# Patient Record
Sex: FEMALE | Race: WHITE | NOT HISPANIC OR LATINO | Employment: OTHER | URBAN - METROPOLITAN AREA
[De-identification: names, ages, dates, MRNs, and addresses within clinical notes are randomized per-mention and may not be internally consistent; named-entity substitution may affect disease eponyms.]

---

## 2017-06-23 ENCOUNTER — APPOINTMENT (OUTPATIENT)
Dept: LAB | Age: 77
End: 2017-06-23
Payer: MEDICARE

## 2017-06-23 ENCOUNTER — TRANSCRIBE ORDERS (OUTPATIENT)
Dept: ADMINISTRATIVE | Age: 77
End: 2017-06-23

## 2017-06-23 DIAGNOSIS — M81.0 SENILE OSTEOPOROSIS: ICD-10-CM

## 2017-06-23 DIAGNOSIS — E78.2 MIXED HYPERLIPIDEMIA: ICD-10-CM

## 2017-06-23 DIAGNOSIS — E78.2 MIXED HYPERLIPIDEMIA: Primary | ICD-10-CM

## 2017-06-23 LAB
25(OH)D3 SERPL-MCNC: 34.4 NG/ML (ref 30–100)
ALBUMIN SERPL BCP-MCNC: 3.4 G/DL (ref 3.5–5)
ALP SERPL-CCNC: 89 U/L (ref 46–116)
ALT SERPL W P-5'-P-CCNC: 26 U/L (ref 12–78)
ANION GAP SERPL CALCULATED.3IONS-SCNC: 8 MMOL/L (ref 4–13)
AST SERPL W P-5'-P-CCNC: 19 U/L (ref 5–45)
BASOPHILS # BLD AUTO: 0.05 THOUSANDS/ΜL (ref 0–0.1)
BASOPHILS NFR BLD AUTO: 1 % (ref 0–1)
BILIRUB SERPL-MCNC: 0.39 MG/DL (ref 0.2–1)
BUN SERPL-MCNC: 16 MG/DL (ref 5–25)
CALCIUM SERPL-MCNC: 9.4 MG/DL (ref 8.3–10.1)
CHLORIDE SERPL-SCNC: 101 MMOL/L (ref 100–108)
CHOLEST SERPL-MCNC: 185 MG/DL (ref 50–200)
CO2 SERPL-SCNC: 29 MMOL/L (ref 21–32)
CREAT SERPL-MCNC: 0.55 MG/DL (ref 0.6–1.3)
EOSINOPHIL # BLD AUTO: 0.14 THOUSAND/ΜL (ref 0–0.61)
EOSINOPHIL NFR BLD AUTO: 2 % (ref 0–6)
ERYTHROCYTE [DISTWIDTH] IN BLOOD BY AUTOMATED COUNT: 16.2 % (ref 11.6–15.1)
EST. AVERAGE GLUCOSE BLD GHB EST-MCNC: 137 MG/DL
GFR SERPL CREATININE-BSD FRML MDRD: >60 ML/MIN/1.73SQ M
GLUCOSE P FAST SERPL-MCNC: 134 MG/DL (ref 65–99)
HBA1C MFR BLD: 6.4 % (ref 4.2–6.3)
HCT VFR BLD AUTO: 46 % (ref 34.8–46.1)
HDLC SERPL-MCNC: 41 MG/DL (ref 40–60)
HGB BLD-MCNC: 14.4 G/DL (ref 11.5–15.4)
LDLC SERPL CALC-MCNC: 111 MG/DL (ref 0–100)
LYMPHOCYTES # BLD AUTO: 1.27 THOUSANDS/ΜL (ref 0.6–4.47)
LYMPHOCYTES NFR BLD AUTO: 20 % (ref 14–44)
MCH RBC QN AUTO: 28.5 PG (ref 26.8–34.3)
MCHC RBC AUTO-ENTMCNC: 31.3 G/DL (ref 31.4–37.4)
MCV RBC AUTO: 91 FL (ref 82–98)
MONOCYTES # BLD AUTO: 0.46 THOUSAND/ΜL (ref 0.17–1.22)
MONOCYTES NFR BLD AUTO: 7 % (ref 4–12)
NEUTROPHILS # BLD AUTO: 4.47 THOUSANDS/ΜL (ref 1.85–7.62)
NEUTS SEG NFR BLD AUTO: 70 % (ref 43–75)
NRBC BLD AUTO-RTO: 0 /100 WBCS
PLATELET # BLD AUTO: 254 THOUSANDS/UL (ref 149–390)
PMV BLD AUTO: 12.7 FL (ref 8.9–12.7)
POTASSIUM SERPL-SCNC: 4 MMOL/L (ref 3.5–5.3)
PROT SERPL-MCNC: 7.4 G/DL (ref 6.4–8.2)
RBC # BLD AUTO: 5.06 MILLION/UL (ref 3.81–5.12)
SODIUM SERPL-SCNC: 138 MMOL/L (ref 136–145)
TRIGL SERPL-MCNC: 167 MG/DL
TSH SERPL DL<=0.05 MIU/L-ACNC: 1.22 UIU/ML (ref 0.36–3.74)
WBC # BLD AUTO: 6.4 THOUSAND/UL (ref 4.31–10.16)

## 2017-06-23 PROCEDURE — 80053 COMPREHEN METABOLIC PANEL: CPT | Performed by: INTERNAL MEDICINE

## 2017-06-23 PROCEDURE — 36415 COLL VENOUS BLD VENIPUNCTURE: CPT

## 2017-06-23 PROCEDURE — 85025 COMPLETE CBC W/AUTO DIFF WBC: CPT

## 2017-06-23 PROCEDURE — 82306 VITAMIN D 25 HYDROXY: CPT

## 2017-06-23 PROCEDURE — 84443 ASSAY THYROID STIM HORMONE: CPT

## 2017-06-23 PROCEDURE — 83036 HEMOGLOBIN GLYCOSYLATED A1C: CPT

## 2017-06-23 PROCEDURE — 80061 LIPID PANEL: CPT

## 2017-07-11 ENCOUNTER — ALLSCRIPTS OFFICE VISIT (OUTPATIENT)
Dept: OTHER | Facility: OTHER | Age: 77
End: 2017-07-11

## 2017-08-07 ENCOUNTER — GENERIC CONVERSION - ENCOUNTER (OUTPATIENT)
Dept: OTHER | Facility: OTHER | Age: 77
End: 2017-08-07

## 2017-10-09 ENCOUNTER — GENERIC CONVERSION - ENCOUNTER (OUTPATIENT)
Dept: OTHER | Facility: OTHER | Age: 77
End: 2017-10-09

## 2017-10-13 ENCOUNTER — TRANSCRIBE ORDERS (OUTPATIENT)
Dept: ADMINISTRATIVE | Age: 77
End: 2017-10-13

## 2017-10-13 ENCOUNTER — LAB CONVERSION - ENCOUNTER (OUTPATIENT)
Dept: OTHER | Facility: OTHER | Age: 77
End: 2017-10-13

## 2017-10-13 ENCOUNTER — APPOINTMENT (OUTPATIENT)
Dept: LAB | Age: 77
End: 2017-10-13
Payer: MEDICARE

## 2017-10-13 DIAGNOSIS — E78.2 MIXED HYPERLIPIDEMIA: Primary | ICD-10-CM

## 2017-10-13 DIAGNOSIS — E78.2 MIXED HYPERLIPIDEMIA: ICD-10-CM

## 2017-10-13 DIAGNOSIS — E13.8 DIABETES MELLITUS OF OTHER TYPE WITH COMPLICATION, UNSPECIFIED LONG TERM INSULIN USE STATUS: ICD-10-CM

## 2017-10-13 LAB
ALBUMIN SERPL BCP-MCNC: 3.3 G/DL (ref 3.5–5)
ALP SERPL-CCNC: 84 U/L (ref 46–116)
ALT SERPL W P-5'-P-CCNC: 23 U/L (ref 12–78)
ANION GAP SERPL CALCULATED.3IONS-SCNC: 5 MMOL/L (ref 4–13)
AST SERPL W P-5'-P-CCNC: 12 U/L (ref 5–45)
BILIRUB SERPL-MCNC: 0.46 MG/DL (ref 0.2–1)
BUN SERPL-MCNC: 13 MG/DL (ref 5–25)
CALCIUM SERPL-MCNC: 9.4 MG/DL (ref 8.3–10.1)
CHLORIDE SERPL-SCNC: 100 MMOL/L (ref 100–108)
CHOLEST SERPL-MCNC: 167 MG/DL (ref 50–200)
CO2 SERPL-SCNC: 31 MMOL/L (ref 21–32)
CREAT SERPL-MCNC: 0.54 MG/DL (ref 0.6–1.3)
EST. AVERAGE GLUCOSE BLD GHB EST-MCNC: 134 MG/DL
GFR SERPL CREATININE-BSD FRML MDRD: 91 ML/MIN/1.73SQ M
GLUCOSE P FAST SERPL-MCNC: 118 MG/DL (ref 65–99)
HBA1C MFR BLD: 6.3 % (ref 4.2–6.3)
HDLC SERPL-MCNC: 45 MG/DL (ref 40–60)
LDLC SERPL CALC-MCNC: 89 MG/DL (ref 0–100)
POTASSIUM SERPL-SCNC: 4.1 MMOL/L (ref 3.5–5.3)
PROT SERPL-MCNC: 7.4 G/DL (ref 6.4–8.2)
SODIUM SERPL-SCNC: 136 MMOL/L (ref 136–145)
TRIGL SERPL-MCNC: 165 MG/DL

## 2017-10-13 PROCEDURE — 80053 COMPREHEN METABOLIC PANEL: CPT

## 2017-10-13 PROCEDURE — 83036 HEMOGLOBIN GLYCOSYLATED A1C: CPT

## 2017-10-13 PROCEDURE — 36415 COLL VENOUS BLD VENIPUNCTURE: CPT

## 2017-10-13 PROCEDURE — 80061 LIPID PANEL: CPT

## 2018-01-10 NOTE — MISCELLANEOUS
Letter of Medical Necessity    2017    Patient name: Ramona Lambert                              Equipment: Ramona Henrandez Power Wheelchair  INS ID: [id number]; [id number]  : 1940                                                 DOP: 10/20/2009  DX: Ariane Fernandez                                                               S/N: [id number]      Sigifredo Patterson utilizes her power wheelchair as her primary means of mobility and depends on her  power wheelchair for all of her MRADLs  Sigifredo Patterson still uses her power wheelchair & it requires repairs  Sigifredo Patterson requires the following replacement part(s) for use on her power wheelchair  1   [employer] Part# M22 SLD G FT- 12volt, 50 Amp Heavy  Duty Gel Battery (2) each: not holding a charge, low voltage  2  [employer] Part# [id number]- Tire, 14" flat free (2) each: tires worn, deteriorated beyond factory services limits  3  [employer] Part# [id number]- shakira wheel w/ bearing 6x2 (2) each: casters  are cracked and bearing disintegration  The above repairs will require 17 units of labor to remove the old parts and to install the new parts, and perform diagnosis and safety inspection  The above repair is necessary to keep the power wheelchair  safe and operational for Sigifredo Patterson  As the treating physician following the care of Sigifredo Patterson I have read and agree with the information in this report  I concur that the above mentioned parts/repairs are necessary  PAUL Hills                                                2017                          NPI: [id number]      Electronically signed Handy HENDERSON    Aug 14 2017  3:22PM EST Author

## 2018-01-13 VITALS — RESPIRATION RATE: 16 BRPM | HEART RATE: 72 BPM | SYSTOLIC BLOOD PRESSURE: 128 MMHG | DIASTOLIC BLOOD PRESSURE: 70 MMHG

## 2018-01-22 VITALS — DIASTOLIC BLOOD PRESSURE: 82 MMHG | HEART RATE: 68 BPM | RESPIRATION RATE: 16 BRPM | SYSTOLIC BLOOD PRESSURE: 138 MMHG

## 2018-05-15 ENCOUNTER — TELEPHONE (OUTPATIENT)
Dept: NEUROLOGY | Facility: CLINIC | Age: 78
End: 2018-05-15

## 2018-05-15 NOTE — TELEPHONE ENCOUNTER
Left message stating that Dr Simone Oliveira has a meeting and we have to switch her appt to a different time with Emilio Issa

## 2018-05-25 ENCOUNTER — APPOINTMENT (OUTPATIENT)
Dept: LAB | Age: 78
End: 2018-05-25
Payer: MEDICARE

## 2018-05-25 ENCOUNTER — TRANSCRIBE ORDERS (OUTPATIENT)
Dept: ADMINISTRATIVE | Age: 78
End: 2018-05-25

## 2018-05-25 DIAGNOSIS — I10 ESSENTIAL HYPERTENSION, MALIGNANT: Primary | ICD-10-CM

## 2018-05-25 DIAGNOSIS — I10 ESSENTIAL HYPERTENSION, MALIGNANT: ICD-10-CM

## 2018-05-25 LAB
ALBUMIN SERPL BCP-MCNC: 3.3 G/DL (ref 3.5–5)
ALP SERPL-CCNC: 90 U/L (ref 46–116)
ALT SERPL W P-5'-P-CCNC: 28 U/L (ref 12–78)
ANION GAP SERPL CALCULATED.3IONS-SCNC: 7 MMOL/L (ref 4–13)
AST SERPL W P-5'-P-CCNC: 24 U/L (ref 5–45)
BASOPHILS # BLD AUTO: 0.04 THOUSANDS/ΜL (ref 0–0.1)
BASOPHILS NFR BLD AUTO: 1 % (ref 0–1)
BILIRUB SERPL-MCNC: 0.45 MG/DL (ref 0.2–1)
BUN SERPL-MCNC: 16 MG/DL (ref 5–25)
CALCIUM SERPL-MCNC: 9.1 MG/DL (ref 8.3–10.1)
CHLORIDE SERPL-SCNC: 101 MMOL/L (ref 100–108)
CHOLEST SERPL-MCNC: 149 MG/DL (ref 50–200)
CO2 SERPL-SCNC: 27 MMOL/L (ref 21–32)
CREAT SERPL-MCNC: 0.66 MG/DL (ref 0.6–1.3)
EOSINOPHIL # BLD AUTO: 0.19 THOUSAND/ΜL (ref 0–0.61)
EOSINOPHIL NFR BLD AUTO: 3 % (ref 0–6)
ERYTHROCYTE [DISTWIDTH] IN BLOOD BY AUTOMATED COUNT: 15.5 % (ref 11.6–15.1)
EST. AVERAGE GLUCOSE BLD GHB EST-MCNC: 131 MG/DL
GFR SERPL CREATININE-BSD FRML MDRD: 85 ML/MIN/1.73SQ M
GLUCOSE P FAST SERPL-MCNC: 111 MG/DL (ref 65–99)
HBA1C MFR BLD: 6.2 % (ref 4.2–6.3)
HCT VFR BLD AUTO: 41.5 % (ref 34.8–46.1)
HDLC SERPL-MCNC: 42 MG/DL (ref 40–60)
HGB BLD-MCNC: 13.4 G/DL (ref 11.5–15.4)
LDLC SERPL CALC-MCNC: 80 MG/DL (ref 0–100)
LYMPHOCYTES # BLD AUTO: 1.46 THOUSANDS/ΜL (ref 0.6–4.47)
LYMPHOCYTES NFR BLD AUTO: 20 % (ref 14–44)
MCH RBC QN AUTO: 28.9 PG (ref 26.8–34.3)
MCHC RBC AUTO-ENTMCNC: 32.3 G/DL (ref 31.4–37.4)
MCV RBC AUTO: 90 FL (ref 82–98)
MONOCYTES # BLD AUTO: 0.6 THOUSAND/ΜL (ref 0.17–1.22)
MONOCYTES NFR BLD AUTO: 8 % (ref 4–12)
NEUTROPHILS # BLD AUTO: 4.92 THOUSANDS/ΜL (ref 1.85–7.62)
NEUTS SEG NFR BLD AUTO: 68 % (ref 43–75)
NONHDLC SERPL-MCNC: 107 MG/DL
NRBC BLD AUTO-RTO: 0 /100 WBCS
PLATELET # BLD AUTO: 292 THOUSANDS/UL (ref 149–390)
PMV BLD AUTO: 11.8 FL (ref 8.9–12.7)
POTASSIUM SERPL-SCNC: 4.3 MMOL/L (ref 3.5–5.3)
PROT SERPL-MCNC: 7.2 G/DL (ref 6.4–8.2)
RBC # BLD AUTO: 4.63 MILLION/UL (ref 3.81–5.12)
SODIUM SERPL-SCNC: 135 MMOL/L (ref 136–145)
TRIGL SERPL-MCNC: 133 MG/DL
TSH SERPL DL<=0.05 MIU/L-ACNC: 1.59 UIU/ML (ref 0.36–3.74)
WBC # BLD AUTO: 7.22 THOUSAND/UL (ref 4.31–10.16)

## 2018-05-25 PROCEDURE — 83036 HEMOGLOBIN GLYCOSYLATED A1C: CPT

## 2018-05-25 PROCEDURE — 85025 COMPLETE CBC W/AUTO DIFF WBC: CPT

## 2018-05-25 PROCEDURE — 80061 LIPID PANEL: CPT

## 2018-05-25 PROCEDURE — 36415 COLL VENOUS BLD VENIPUNCTURE: CPT

## 2018-05-25 PROCEDURE — 80053 COMPREHEN METABOLIC PANEL: CPT

## 2018-05-25 PROCEDURE — 84443 ASSAY THYROID STIM HORMONE: CPT

## 2018-06-11 ENCOUNTER — OFFICE VISIT (OUTPATIENT)
Dept: NEUROLOGY | Facility: CLINIC | Age: 78
End: 2018-06-11
Payer: MEDICARE

## 2018-06-11 VITALS
HEART RATE: 67 BPM | HEIGHT: 65 IN | SYSTOLIC BLOOD PRESSURE: 118 MMHG | BODY MASS INDEX: 31.65 KG/M2 | WEIGHT: 190 LBS | DIASTOLIC BLOOD PRESSURE: 60 MMHG

## 2018-06-11 DIAGNOSIS — R25.2 SPASTICITY: ICD-10-CM

## 2018-06-11 DIAGNOSIS — G35 MULTIPLE SCLEROSIS (HCC): Primary | ICD-10-CM

## 2018-06-11 PROBLEM — G25.0 BENIGN ESSENTIAL TREMOR: Status: ACTIVE | Noted: 2017-07-11

## 2018-06-11 PROBLEM — I10 HYPERTENSION, ESSENTIAL: Status: ACTIVE | Noted: 2017-09-07

## 2018-06-11 PROCEDURE — 99214 OFFICE O/P EST MOD 30 MIN: CPT | Performed by: PHYSICIAN ASSISTANT

## 2018-06-11 RX ORDER — TRAMADOL HYDROCHLORIDE 50 MG/1
50 TABLET ORAL EVERY 6 HOURS
COMMUNITY
Start: 2017-09-07 | End: 2018-09-07

## 2018-06-11 RX ORDER — BACLOFEN 20 MG/1
20 TABLET ORAL 4 TIMES DAILY
Qty: 360 TABLET | Refills: 3 | Status: SHIPPED | OUTPATIENT
Start: 2018-06-11 | End: 2019-06-17 | Stop reason: SDUPTHER

## 2018-06-11 RX ORDER — KETOCONAZOLE 20 MG/ML
SHAMPOO TOPICAL
COMMUNITY
Start: 2016-04-15

## 2018-06-11 RX ORDER — TIZANIDINE 4 MG/1
4 TABLET ORAL 4 TIMES DAILY PRN
Qty: 360 TABLET | Refills: 3 | Status: SHIPPED | OUTPATIENT
Start: 2018-06-11 | End: 2019-06-17 | Stop reason: SDUPTHER

## 2018-06-11 RX ORDER — GABAPENTIN 300 MG/1
CAPSULE ORAL
COMMUNITY
Start: 2015-09-18 | End: 2018-06-11 | Stop reason: SDUPTHER

## 2018-06-11 RX ORDER — GABAPENTIN 300 MG/1
CAPSULE ORAL
Qty: 450 CAPSULE | Refills: 3 | Status: SHIPPED | OUTPATIENT
Start: 2018-06-11 | End: 2019-06-17 | Stop reason: SDUPTHER

## 2018-06-11 RX ORDER — BACLOFEN 20 MG/1
TABLET ORAL
COMMUNITY
End: 2018-06-11 | Stop reason: SDUPTHER

## 2018-06-11 RX ORDER — WARFARIN SODIUM 2.5 MG/1
1 TABLET ORAL DAILY
COMMUNITY

## 2018-06-11 RX ORDER — CLOBETASOL PROPIONATE 0.05 G/ML
SPRAY TOPICAL
COMMUNITY
Start: 2013-10-01

## 2018-06-11 RX ORDER — LOSARTAN POTASSIUM 50 MG/1
1 TABLET ORAL DAILY
COMMUNITY
End: 2018-06-11 | Stop reason: SDUPTHER

## 2018-06-11 RX ORDER — TIZANIDINE 4 MG/1
2 TABLET ORAL
COMMUNITY
Start: 2009-06-12 | End: 2018-06-11 | Stop reason: SDUPTHER

## 2018-06-11 RX ORDER — LOSARTAN POTASSIUM 100 MG/1
100 TABLET ORAL
COMMUNITY
Start: 2018-06-01 | End: 2021-06-15

## 2018-06-11 RX ORDER — ATORVASTATIN CALCIUM 20 MG/1
20 TABLET, FILM COATED ORAL
COMMUNITY
Start: 2018-06-01

## 2018-06-11 RX ORDER — MULTIVITAMIN
TABLET ORAL
COMMUNITY

## 2018-06-11 RX ORDER — SOLIFENACIN SUCCINATE 5 MG/1
5 TABLET, FILM COATED ORAL
COMMUNITY
Start: 2012-10-10 | End: 2018-10-16 | Stop reason: ALTCHOICE

## 2018-06-11 NOTE — PROGRESS NOTES
Patient ID: Amilcar Gates is a 68 y o  female  Assessment/Plan:    Multiple sclerosis ([facility] )  Patient with long-standing MS, not on any IMD therapy  She remains overall stable  She has had some increased spasticity in the right upper extremity, therefore causing trouble with her using the controls on her power wheelchair  She is requesting some therapy for this  Referral given for PT and OT to work on right upper extremity strengthening and functional mobility  We also discussed possibly referring her to physiatry to discuss Botox injections  She reports that her podiatrist suggested Botox for her right foot/ankle due to the foot turning in as well  She has been reluctant to try this  She is going to try therapy 1st and then consider physiatry referral   Exam is overall stable today  Medications were refilled today, specifically gabapentin, baclofen, tizanidine  Will see her back in the fall before she returns to [place] for the winter  She is to call for any new or worsening symptoms  Spasticity  See above  Continue baclofen and tizanidine  Consider referral to physiatry for consideration of Botox injections in the right upper extremity possibly  Diagnoses and all orders for this visit:    Multiple sclerosis ([facility] )  -     Ambulatory referral to Physical Therapy; Future  -     Ambulatory referral to Occupational Therapy; Future    Spasticity    Other orders           Subjective:    HPI    Patient is a 68year old female with PMH of MS diagnosed in 1982, DM, uterine CA and hyperlipidemia, 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,  who presents for MS follow up  Patient last seen in October 2017  Patients symptoms at time of presentation of her MS included facial paresthesias  No hx of optic neuritis  She has never been on IMD therapy  Patient reports at the age of 43 when she presented she had many tests done at [facility] and told she had probable MS    She had some difficulty with walking at that time  In 2000 while in the hospital for cellulitis from falls, she noted some additional overall worsening of her symptoms  Patient had been followed by the same neurologist for about 20 years  She was told that time of diagnosis that 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  Patient has had some tremor in the left arm, mainly with action and not at rest   Patient does have a strong family history of action tremor in her father and her paternal grandfather  It is likely that this is a benign essential tremor  Patient has not been interested in any updated imaging  She has had ongoing facial paresthesias with no change  Patient has remained on gabapentin  She has used tramadol sparingly for breakthrough pain  She also continues with her baclofen and tizanidine, which have been keeping her spasticity stable  Today, patient reports she is about the same as last visit  She is experiencing some sciatic nerve pain down the left leg, which is typical for her at times and is long-standing  She reports she is having some increased trouble with her right arm, which always has some spasticity  She is requesting some therapy for this  She has trouble using the controls on her power wheelchair due to this  She denies change in vision  No change in bowel or bladder, speech or swallowing  No recent infections  No recent hospitalizations  Labs done last month by her PCP  Normal CBC and LFTs  The following portions of the patient's history were reviewed and updated as appropriate: current medications, past family history, past medical history, past social history, past surgical history and problem list          Objective:    Blood pressure 118/60, pulse 67, height 5' 5" (1 651 m), weight 86 2 kg (190 lb)  Physical Exam   Constitutional: She appears well-developed and well-nourished  HENT:   Head: Normocephalic and atraumatic     Eyes: EOM are normal  Pupils are equal, round, and reactive to light  Cardiovascular: Intact distal pulses  Neurological: Coordination normal    Reflex Scores:       Bicep reflexes are 1+ on the right side and 1+ on the left side  Brachioradialis reflexes are 1+ on the right side and 1+ on the left side  Patellar reflexes are 1+ on the right side and 1+ on the left side  Skin: Skin is warm and dry  Psychiatric: She has a normal mood and affect  Her speech is normal        Neurological Exam    Mental Status  The patient is alert and oriented to person, place, time, and situation  Her recent and remote memory are normal  Her speech is normal  Her language is fluent with no aphasia  She has normal attention span and concentration  She has a normal fund of knowledge  Cranial Nerves    CN II: The patient's visual acuity and visual fields are normal   CN III, IV, VI: The patient's pupils are equally round and reactive to light and ocular movements are normal   CN V: The patient has normal facial sensation  CN VII:  The patient has symmetric facial movement  CN VIII:  The patient's hearing is normal   CN IX, X: The patient has symmetric palate movement and normal gag reflex  CN XI: The patient's shoulder shrug strength is normal   CN XII: The patient's tongue is midline without atrophy or fasciculations  Motor  The patient has normal muscle bulk throughout  Her overall muscle tone is increased throughout  Specifically, the tone is spastic in the right arm, increased in the right leg, increased in the left leg                                                Right                   Left   Shoulder abduction                   4+                         4+  Elbow flexion                            4                         4+  Elbow extension                       4                         4+  Hip flexion                                1                         1  Dorsiflexion                               1                         1  trouble with RENE in right UE due to spasticity, mild markel tremor on the left  Dystonia right hand  Bilateral MAFO braces     Sensory  The patient's sensation is normal in all four extremities  Absent vibratory sensation evangelina LE, decreased vib sensation RUE vs LUE     Reflexes  Deep tendon reflexes are 2+ and symmetric except as noted  Right                     Left  Brachioradialis                      1+                         1+  Biceps                                   1+                         1+  Patellar                                 1+                         1+    Gait and Coordination   She has normal coordination bilaterally  In a power wheelchair, unable to ambulate         ROS:    Review of Systems   Constitutional: Negative  Negative for appetite change and fever  HENT: Negative  Negative for hearing loss, tinnitus, trouble swallowing and voice change  Eyes: Negative  Negative for photophobia and pain  Respiratory: Negative  Negative for shortness of breath  Cardiovascular: Negative  Negative for palpitations  Gastrointestinal: Negative  Negative for nausea and vomiting  Endocrine: Negative  Negative for cold intolerance and heat intolerance  Genitourinary: Negative  Negative for dysuria, frequency and urgency  Musculoskeletal: Negative  Negative for myalgias and neck pain  Left leg pain- sciatic nerve   Skin: Negative  Negative for rash  Neurological: Negative  Negative for dizziness, tremors, seizures, syncope, facial asymmetry, speech difficulty, weakness, light-headedness, numbness and headaches  Hematological: Negative  Does not bruise/bleed easily  Psychiatric/Behavioral: Negative  Negative for confusion, hallucinations and sleep disturbance

## 2018-06-11 NOTE — PATIENT INSTRUCTIONS
Will order PT and OT to assist with your right arm and tremor  To consider referral to physiatry  Will refill you tizanidine, baclofen and gabapentin  Follow up in 4 months or sooner if needed    Call for any new symptoms

## 2018-06-11 NOTE — ASSESSMENT & PLAN NOTE
Patient with long-standing MS, not on any IMD therapy  She remains overall stable  She has had some increased spasticity in the right upper extremity, therefore causing trouble with her using the controls on her power wheelchair  She is requesting some therapy for this  Referral given for PT and OT to work on right upper extremity strengthening and functional mobility  We also discussed possibly referring her to physiatry to discuss Botox injections  She reports that her podiatrist suggested Botox for her right foot/ankle due to the foot turning in as well  She has been reluctant to try this  She is going to try therapy 1st and then consider physiatry referral   Exam is overall stable today  Medications were refilled today, specifically gabapentin, baclofen, tizanidine  Will see her back in the fall before she returns to [place] for the winter  She is to call for any new or worsening symptoms

## 2018-06-11 NOTE — ASSESSMENT & PLAN NOTE
See above  Continue baclofen and tizanidine  Consider referral to physiatry for consideration of Botox injections in the right upper extremity possibly

## 2018-06-14 ENCOUNTER — TELEPHONE (OUTPATIENT)
Dept: NEUROLOGY | Facility: CLINIC | Age: 78
End: 2018-06-14

## 2018-06-14 NOTE — TELEPHONE ENCOUNTER
Received a fax from [facility] requesting a call back to confirm supervising physician for Wilver Chávez  I called and spoke to representative and confirmed Dr Stephanie Montoya as her supervising physician

## 2018-07-15 NOTE — PROGRESS NOTES
Occupational Therapy Multiple Sclerosis Evaluation:    Today's Date: 2018  Patient Name: Gracia Elias  : 1940  MRN: [id number]  Referring Provider: Fanny Dias PA-C  Dx: Multiple sclerosis [facility]    Active Problem List:   Patient Active Problem List   Diagnosis    Benign essential tremor    Diabetes mellitus type 2, controlled, without complications ([facility] )    Hyperlipidemia    Hypertension, essential    Multiple sclerosis ([facility] )    Neurogenic bladder    Osteoporosis    Paresthesias    Spasticity     Past Medical Hx:   Past Medical History:   Diagnosis Date    Hyperlipidemia     Multiple sclerosis ([facility] )     Paresthesias      Past Surgical Hx:   Past Surgical History:   Procedure Laterality Date    HYSTERECTOMY        Pain Levels:  Restin    With Activity:  0    Subjective/Patient Goal: "To work on this hand a little bit"    History of Present Illness:  Pt is a pleasant, active, retired 68 y o  female seen for OT eval s/p referred to [address] [place] [address] s/p follow up with neurology for long standing h/o MS, initially dx'd in , now c/o increased facial paresthesias, optic neuritis, difficulty walking, spasticity in RUE, difficulty managing electric w/c controls, also recommended for botox injections w/ PMR c/s, though pt hesitant, now interested in pursuing OT/PT first for more conservative means prior to pursuing PMR c/s, neuro cont'd w/ gabapentin, baclofen, and tizanidine scripts, dx'd w/ MS, spasticity, comorbidities as listed above  Lifestyle Performance Model:  Autonomy: Pt was I w/ UB dressing, max A for LB ADLs/self care, max A for bed bath sponge bathing, max A for catheter management bedpan, shower chair, BSC, grab bars, SPC, QC, RW, HHC, outpt PT, inpt rehab at [facility] in [facility], transfer board w/ gait belt for dependent lateral transfers, has not ambulated since , primarily power scooter/electric w/c bound, tilt in space w/ gel Varun Haring cushion    Reciprocal Relationships: Supportive  Ardelle Peabody  for 54 years Friday , 2 grown children 2 grandchildren  Service to Others: Pt is retired from "[employer]", worked in [employer]  Intrinsic Gratification: Enjoys doing sudoku, puzzles, reading, playing cards, crossword/jigaw puzzles  Home Setup: Pt lives off [address] [place] [address] in [place] w/  Marizol persaud w/ 0 JEREL w/ first floor setup handicapped accessible    Objective  Impairments Section:   UE Strength:   MONY: RUE: 2/200 LUE: 25/200   PINCER: 3 point pinch: RUE unable to complete, LUE:8   2 point pinch: RUE: unable to complete, LUE:5   Lateral pincher: RUE: 2, LUE: 8    Coordination:   9 HOLE PEG TEST:     RUE: unable to complete seconds, LUE: 50 2 Seconds    Range of Motion:  AROM/PROM: RUE spasticity w/ AROM shoulder flexion limited to <25%, distal spastic flexor synergy nonfunctional w/ impaired FMC/FMS/GMC/GMS, impaired prehension patterns    R handed primarily, LUE intention tremors reports is unrelated to MS reports "doctor said it's familial"  Sensation:  MYOFILAMENTS:   RUE: 3 61   LUE: 3 61    Visual Perceptual and Functional Cognition:  1  Brian Cognitive Assessment Version 8 1 (MoCA V8 1)  Visuospatial/executive functionin/5  Naming: 3/3  Memory: 1st trial: 5/5, 2nd trial: 5/5  Attention/concentration: 2/2  List of letters: 1/1  Serial Seven Subtraction: 3/3 w/ 0 errors  Language/sentence repetition: 2/2  Language Fluency: 0/1  Abstract/Correlational Thinkin/2  Delayed Recall: 5/5  Orientation: 6/6   Memory Index Score: 15/15  MoCA V1 8 1 Raw Score: 28/30, MIS: 15/15, indicative of normal neurocognitive functioning  2  Vision Screening Recording Form: Pt denies visual changes, reports recent opthalmologist appt cleared eyes also, denies /VM impairments, + glasses @ all times present for vision screen      Assessment/Plan  Occupational Therapy Skilled Analysis Assessment and Plan of Care:  Pt requires overall max A for ADLs/self care and max Ax1 for fx'l slide board tfers on/off all surfaces, nonambulatory electric w/c bound  Pt is currently demonstrating the following occupational deficits: limited 2* RUE spasticity w/ AROM shoulder flexion limited to <25%, distal spastic flexor synergy nonfunctional w/ impaired FMC/FMS/GMC/GMS, impaired prehension patterns, LUE intention tremors, decreased endurance/activity tolerance, generalized weakness, deconditioning, SOB, BLANCO, fatigue, fall risk, impaired balance, generalized spasticity, nonambulatory, kyphotic anterior forward flexed posture  The following Occupational Performance Areas to address include: eating, grooming, bathing/shower, toilet hygiene, dressing, medication management, socialization, health maintenance, functional mobility, community mobility, clothing management, cleaning, meal prep, money management, household maintenance, care of children, care of pets, job performance/volunteering and social participation  Based on the aforementioned OT evaluation, functional performance deficits, and assessments, pt has been identified as a high complexity evaluation  Pt to continue to benefit from outpatient skilled OT services to address the following goals 2x/wk to  w/in 4 week trial with special focus on LUE tone reduction, self-feeding, grooming, AE, LUE distal strengthening and fx'l use t/o I/ADL/leisure tasks      Goals:  Short Term Goals=Long Term Goals:  Tone:  Pt will demo with decreased RUE  hypertonicity for improved grasp release, termination of flexors on command  50% of time 4 weeks  Pt will demo good carryover of clinic and home tone reduction strategies for improved AROM initiation with functional reach, dressing, hygiene 4 weeks  Modalities:  Pt will tolerate BIONESS for improved motor and sensory performance for overall improved hand to target with 80% accuracy 4 weeks  Pt will tolerate RHS or Saebo Stretch x 6-8 hours for tendon elongation, decreased wrist drop and decreased tone in R hand  Coordination:  Pt will increase prehension patterns for improved tripod with utensil management with <20% droppage 4 weeks  Pt will increase proprioception of  R hand to target for improved functional reach vision occluded with ADL tasks 4 weeks  Pt will increase automaticity of R  to 50% for improved grasp release of tabletop items for improved functional performance with salient tasks 4 weeks  ROM/Function:  Pt will increase R  to functional assist with <20% cuing for tabletop tasks 4 weeks  Pt will increase R  UE to Gross Assist, refined assist, and stabilization with <20% cuing for tabletop tasks 4 weeks  Pt will demo with G carryover of Home Exercise Program to improve functional progression towards goals in Plan of care and for improved functional use of  R 4 weeks  Pt will increase RUE  to refined functional assist with <20% cuing for tabletop tasks for improved functional performance of life roles and salient tasks 4 week    INTERVENTION COMMENTS:  Diagnosis: Multiple sclerosis ([facility] ) [G35]  Precautions: fall risk  FOTO: 5 with 95% limitation  Insurance: MEDICARE A AND B [[id number]]  1 of 10 visits, PN due 8/16/2018    Thank you for the consult!   Please call if you have any questions: [phone]  Noris Rodriguez, OTSANTIAGO, OTR/L, C-GCM, CSRS  Director of Outpatient Neuro Occupational Therapy

## 2018-07-16 ENCOUNTER — EVALUATION (OUTPATIENT)
Dept: OCCUPATIONAL THERAPY | Facility: CLINIC | Age: 78
End: 2018-07-16
Payer: COMMERCIAL

## 2018-07-16 DIAGNOSIS — G35 MULTIPLE SCLEROSIS (HCC): Primary | ICD-10-CM

## 2018-07-16 PROCEDURE — G8985 CARRY GOAL STATUS: HCPCS

## 2018-07-16 PROCEDURE — 97167 OT EVAL HIGH COMPLEX 60 MIN: CPT

## 2018-07-16 PROCEDURE — G8984 CARRY CURRENT STATUS: HCPCS

## 2018-07-23 ENCOUNTER — EVALUATION (OUTPATIENT)
Dept: PHYSICAL THERAPY | Facility: CLINIC | Age: 78
End: 2018-07-23
Payer: COMMERCIAL

## 2018-07-23 DIAGNOSIS — G35 MULTIPLE SCLEROSIS (HCC): ICD-10-CM

## 2018-07-23 PROCEDURE — G8982 BODY POS GOAL STATUS: HCPCS | Performed by: PHYSICAL THERAPIST

## 2018-07-23 PROCEDURE — G8981 BODY POS CURRENT STATUS: HCPCS | Performed by: PHYSICAL THERAPIST

## 2018-07-23 PROCEDURE — 97140 MANUAL THERAPY 1/> REGIONS: CPT | Performed by: PHYSICAL THERAPIST

## 2018-07-23 PROCEDURE — 97161 PT EVAL LOW COMPLEX 20 MIN: CPT | Performed by: PHYSICAL THERAPIST

## 2018-07-23 NOTE — PROGRESS NOTES
PT Evaluation     Today's date: 2018  Patient name: Gracia Elias  : 1940  MRN: [id number]  Referring provider: Fanny Dias PA-C  Dx:   Encounter Diagnosis     ICD-10-CM    1  Multiple sclerosis ([facility] ) [address] Ambulatory referral to Physical Therapy                  Assessment  Impairments: abnormal muscle tone, abnormal or restricted ROM, impaired physical strength and poor posture     Assessment details: Pt is a 68year old female referred for MS however specifically due to her RUE dysfunction and pain  Pt presented today with impairments in increased pain R shoulder, decreased AROM R shoulder, decreased AROM R elbow, (+) tone R biceps, decreased strength BUE all which limit her functionally with using RUE to don clothes, brush hair, and reach forward to use her power WC  Pt will benefit from PT plan below needed to address above deficits to increase use of RUE>  Understanding of Dx/Px/POC: good   Prognosis: fair    Goals  ST  Pt will demonstrate independence with HEP within 4 weeks  2  Pt will increase AROM of R shoulder flexion and abd by at least 5 degrees within 4 weeks  3  Pt will improve R elbow extension by at least 5 deg within 4 weeks  LTGs:  1  Pt will improve R shoulder flex and abd by at least 10 deg within 8 weeks  2  Pt will improve R elbow ext by at least 10 deg within 8 weeks  3  Pt will reduce c/o pain in R shoulder to a 1/10 at worst within 8 weeks  4  Pt will report being able to don her shirt normally again with RUE within 8 weeks      Plan  Patient would benefit from: skilled physical therapy  Planned modality interventions: thermotherapy: hydrocollator packs and cryotherapy  Planned therapy interventions: stretching, strengthening, therapeutic exercise, manual therapy, joint mobilization, home exercise program and patient education  Frequency: 2x week  Duration in weeks: 8  Plan details: Certification period from today 18 through 18        Subjective Evaluation    History of Present Illness  Mechanism of injury: Long-standing diagnosis of MS  At her baseline functionally  Has not walked since   In powerchair  Her  transfers her in and out of bed  Pt then stays in [address]  [facility]  Biggest problem is RUE limited ROM, Had OT john last week, addressing more R hand spasticity and function with them  Wants to be able to move her right shoulder and elbow more to be able to return to combing her hair and using power chair  Has had increased difficulty with donning a shirt because of limited shoulder and elbow AROM  Denies pain in elbow, does have pain in R shoulder occasionally, specifically when attempting to reach forward  Does have complaint of sciatica pain and per  has difficulty with balance in unsupported sitting, pt at this time is not interested in PT services to address this  Pain  Current pain ratin  At best pain ratin  At worst pain rating: 3  Location: R shoulder, around deltoid area, triggered with hand movements  Progression: worsening    Social Support  Lives with: spouse    Hand dominance: right    Treatments  Current treatment: occupational therapy  Patient Goals  Patient goals for therapy: decreased pain, increased motion and increased strength          Objective     Static Posture     Head  Forward  Shoulders  Rounded  Scapulae  Left protracted and right protracted      Active Range of Motion   Left Shoulder   Flexion: 65 degrees   Abduction: 75 degrees     Right Shoulder   Flexion: 30 degrees   Abduction: 58 degrees     Left Elbow   Flexion: WFL  Extension: WFL    Right Elbow   Flexion: WFL  Extension: 80 degrees     Strength/Myotome Testing     Left Shoulder     Planes of Motion   Flexion: 4   Abduction: 3+     Right Shoulder     Planes of Motion   Flexion: 3+ (within her available ROM)   Abduction: 3+ (within her available ROM)     Left Elbow   Flexion: 5  Extension: 5    Right Elbow   Flexion: 5  Extension: 3+    Neuro Exam :     Movement Assessment   Right   Modified Drew UE:  1 (biceps)    Neurologic Exam    Precautions: MS; HTN, DM    Specialty Daily Treatment Diary     Manual  7/23       PROM R shoulder and elbow, all motions, in pt's chair x10 min       Resisted elbow extension on R x10                                   Exercise Diary         Seated scap retract, B        Seated R shoulder ER        Seated forward reaches with RUE touching targets        Seated tilted back AAROM shoulder flex, abd, ER                                                                                                                                            Modalities

## 2018-07-30 ENCOUNTER — APPOINTMENT (OUTPATIENT)
Dept: PHYSICAL THERAPY | Facility: CLINIC | Age: 78
End: 2018-07-30
Payer: COMMERCIAL

## 2018-07-31 ENCOUNTER — OFFICE VISIT (OUTPATIENT)
Dept: OCCUPATIONAL THERAPY | Facility: CLINIC | Age: 78
End: 2018-07-31
Payer: COMMERCIAL

## 2018-07-31 ENCOUNTER — OFFICE VISIT (OUTPATIENT)
Dept: PHYSICAL THERAPY | Facility: CLINIC | Age: 78
End: 2018-07-31
Payer: COMMERCIAL

## 2018-07-31 DIAGNOSIS — G35 MULTIPLE SCLEROSIS (HCC): Primary | ICD-10-CM

## 2018-07-31 PROCEDURE — 97112 NEUROMUSCULAR REEDUCATION: CPT

## 2018-07-31 PROCEDURE — 97140 MANUAL THERAPY 1/> REGIONS: CPT

## 2018-07-31 NOTE — PROGRESS NOTES
Daily Note     Today's date: 2018  Patient name: Nicky Velasco  : 1940  MRN: [id number]  Referring provider: Marbella Zhou PA-C  Dx:   Encounter Diagnosis     ICD-10-CM    1  Multiple sclerosis ([facility] [address] ) G35                   Subjective: Pt  Noted pain in R shoulder pre treatment and throughout exercises  Objective: See treatment diary below      Assessment: Patient had difficulty with wand flexion with keeping her right hand on the cane without falling off  Trailed wrapping with Velcro wraps did not work patient's  hand still slid off of cane  Wand ER and abd trailed with therapist assisting to guide arm and forearm in correct motion with some support to keep wand in place  Trailed isometric shoulder flexion with therapist holding the ball and patient pushing forward into ball  Patient was able to perform  recommended dosage for exercise  Patient noted that she notices her Upper arm performing more of the work then forearm muscles while performing exercises today  Ended treatment early due to patient stating that she was going to have soreness later and some fatigue post exercises  Patient would benefit from continued PT  Plan: Continue per plan of care  Patient will monitor and let therapist know how she feels NV  Precautions: MS; HTN, DM    Specialty Daily Treatment Diary     Manual        PROM R shoulder and elbow, all motions, in pt's chair x10 min x15 min      Resisted elbow extension on R x10 x5 min                                  Exercise Diary   2018      Seated scap retract, B  5"x20      Seated R shoulder ER  15x      Seated forward reaches with RUE touching targets  Reaching for popscile sticks at different arm lengthens and positions   x20      Seated tilted back AAROM shoulder flex, abd, ER  Wand flexion x5  Wand ER and wand abd x20 with therapist assist       Flexion isometrics into small striped ball  5"x10   nv consider shoulder  ER IR EXT Modalities

## 2018-07-31 NOTE — PROGRESS NOTES
Daily Note     Today's date: 2018  Patient name: Win Dozier  : 1940  MRN: [id number]  Referring provider: Chantel Noland PA-C  Dx:   Encounter Diagnosis   Name Primary?  Multiple sclerosis ([place] [address] ) Yes                  Subjective: "Yeah right! I can't "      Objective: See treatment diary below      Assessment: Tolerated treatment fair  Arrived 10min late to session for unknown reason  NMES for 10min to R wrist extensors with PROM to facilitate wrist extension, tone reduction, and sensory input  Completed [place] questionnaire- 5/10 answered yes so proceeded with manual massage and passive stretch to R wrist and digits instead  Card matching task for improved [facility], grasp/release, and hand to target accuracy with card retrieval from R side, supination to reveal card and place on board ahead  Pt unable to complete any AROM R digit extension, requiring hand-over-hand assist to extend  Utilized high flexor tone to grasp card  G ability to supinate to reveal card but max difficulty with flexor termination to release item  Mod verbal cues for encouragement to overcome self-limiting behavior  Plan: Continue skilled OT per POC with focus on RUE tone reduction, [facility], automaticity for grasp/release, and hand to target accuracy        INTERVENTION COMMENTS:  Diagnosis: Multiple sclerosis ([place] [address] ) [G35]  Precautions: fall risk  FOTO: 5 with 95% limitation  Insurance: MEDICARE A AND B [[id number]]  2 of 10 visits, PN due 2018

## 2018-08-01 ENCOUNTER — APPOINTMENT (OUTPATIENT)
Dept: PHYSICAL THERAPY | Facility: CLINIC | Age: 78
End: 2018-08-01
Payer: MEDICARE

## 2018-08-02 ENCOUNTER — OFFICE VISIT (OUTPATIENT)
Dept: OCCUPATIONAL THERAPY | Facility: CLINIC | Age: 78
End: 2018-08-02
Payer: MEDICARE

## 2018-08-02 DIAGNOSIS — G35 MULTIPLE SCLEROSIS (HCC): Primary | ICD-10-CM

## 2018-08-02 PROCEDURE — 97110 THERAPEUTIC EXERCISES: CPT

## 2018-08-02 PROCEDURE — G8985 CARRY GOAL STATUS: HCPCS

## 2018-08-02 PROCEDURE — G8984 CARRY CURRENT STATUS: HCPCS

## 2018-08-02 NOTE — PROGRESS NOTES
Occupational Therapy Daily Note:    Today's date: 2018  Patient name: Winter Mooney  : 1940  MRN: [id number]  Referring provider: Delilah Schroeder PA-C  Dx:   Encounter Diagnosis   Name Primary?  Multiple sclerosis (HCC) Yes     Subjective: "Whatever you want to do I'll give a solid maybe"  Objective: See treatment diary below  Assessment: Pt seen for OT treatment session focusing on proximal/distal MH x 5 minutes, proximal/dsital vibration, trialed NMES again for wrist/MCP/PIP/DIP extension w/ minimal motor output despite multiple trials for moving electrodes  Introduced to [employer] w/ trial for A/B/C panels, best motor output for MCP/DIP extension w/ C panels x10 minutes for neuro modulation neuro motor/sensory re-integration w/ G tolerance  Pt tolerated distal vibration biceps for elbow stretching to achieve extension, able to passively stretch to 60% end range no reports of pain/discomfort  Pt tolerated proximal vibration for trapezius, infra/supraspinatus and levator scapula to passive stretch shoulder to 60% end range for shoulder flexion, no reports of pain/discomfort  Pt tolerated IASTM for retrograde massage, questionnaire indicated 5/10 contraindications though will trial IASTM tools next session for myofasical release  Pt agreeable  Discussed w/ PT transition pt to OT only for RUE management for manual therapy, modalities, and therex/stretching to maximize FMC/FMS/GMC/GMS as prerequisite for UB ADL/IADLS/leisure tasks engagement w/ RUE  Pt, , and Eric Toney PT agreeable  All future PT appointments removed, new calendar printed for pt and , FDC's notified      Pt continues to demonstrate  limited 2* RUE spasticity w/ AROM shoulder flexion limited to <25%, distal spastic flexor synergy nonfunctional w/ impaired FMC/FMS/GMC/GMS, impaired prehension patterns, LUE intention tremors, decreased endurance/activity tolerance, generalized weakness, deconditioning, SOB, BLANCO, fatigue, fall risk, impaired balance, generalized spasticity, nonambulatory, kyphotic anterior forward flexed posture  Tolerated treatment well  Patient would benefit from continued skilled OT  Plan: Continued skilled OT per POC with focus on LUE tone reduction, fx'l LUE use t/o I/ADL/leisure tasks, FMC/FMS/GMC/GMS  INTERVENTION COMMENTS:  Diagnosis: Multiple sclerosis ([facility] [address] ) Lieutenant Harper  Precautions: fall risk  FOTO: 5 with 95% limitation  Insurance: MEDICARE A AND B [[id number]]  3 of 10 visits, PN due 8/16/2018    Thank you for the consult!   Please call if you have any questions: [phone]  Lissette Gonzalez, OTD, OTR/L, C-GCM, CSRS  Director of Outpatient Neuro Occupational Therapy

## 2018-08-06 ENCOUNTER — APPOINTMENT (OUTPATIENT)
Dept: PHYSICAL THERAPY | Facility: CLINIC | Age: 78
End: 2018-08-06
Payer: MEDICARE

## 2018-08-06 ENCOUNTER — OFFICE VISIT (OUTPATIENT)
Dept: OCCUPATIONAL THERAPY | Facility: CLINIC | Age: 78
End: 2018-08-06
Payer: MEDICARE

## 2018-08-06 DIAGNOSIS — G35 MULTIPLE SCLEROSIS (HCC): Primary | ICD-10-CM

## 2018-08-06 PROCEDURE — 97110 THERAPEUTIC EXERCISES: CPT

## 2018-08-06 NOTE — PROGRESS NOTES
Occupational Therapy Daily Note:    Today's date: 2018  Patient name: Ariana Benedict  : 1940  MRN: [id number]  Referring provider: Makayla Gama PA-C  Dx:   Encounter Diagnosis   Name Primary?  Multiple sclerosis (HCC) Yes      Subjective: "I know the goal is to have these fingers spread out but they just won't do it"  Objective: See treatment diary below  Assessment: Pt seen for OT treatment session focusing on proximal/distal MH x5 minutes followed by Bioness for neuro modulation neuro sensory motor re-education x10 minutes w/ G tolerance  Pt tolerated distal vibration biceps for elbow stretching to achieve extension, able to passively stretch to 60% end range no reports of pain/discomfort  Pt tolerated proximal vibration for trapezius, infra/supraspinatus and levator scapula to passive stretch shoulder to 60% end range for shoulder flexion, no reports of pain/discomfort  Trialed IASTM to distal/proximal myofascial release w/ G tolerance, denies pain/discomfort  Pt engaged in subsequent fx'l activity for colored block retrieval to promote MCP/DIP/PIP extension unable to do so, downgraded to bean bag toss w/ LUE for tabletop gross grasp and slide off table, also unable to do so, self limiting behaviors reporting "yeah right" "that'll never happen", max cues to trial engagement in task  Ultimately engaged in [employer] card retrieval from far R peripheral field to far L peripheral field w/ lateral pincer grasp  C/o proximal>distal fatigue post session  Pt continues to demonstrate  limited 2* RUE spasticity w/ AROM shoulder flexion limited to <25%, distal spastic flexor synergy nonfunctional w/ impaired FMC/FMS/GMC/GMS, impaired prehension patterns, LUE intention tremors, decreased endurance/activity tolerance, generalized weakness, deconditioning, SOB, BLANCO, fatigue, fall risk, impaired balance, generalized spasticity, nonambulatory, kyphotic anterior forward flexed posture   Tolerated treatment well  Patient would benefit from continued skilled OT      Plan: Continued skilled OT per POC with focus on LUE tone reduction, fx'l LUE use t/o I/ADL/leisure tasks, FMC/FMS/GMC/GMS      INTERVENTION COMMENTS:  Diagnosis: Multiple sclerosis ([facility] [address] ) Lestine Stagers  Precautions: fall risk  FOTO: 5 with 95% limitation  Insurance: MEDICARE A AND B [[id number]]  4 of 10 visits, PN due 8/16/2018     Thank you for the consult!   Please call if you have any questions: [phone]  Bryn Diaz, OTD, OTR/L, C-GCM, CSRS  Director of Outpatient Neuro Occupational Therapy

## 2018-08-07 ENCOUNTER — TELEPHONE (OUTPATIENT)
Dept: NEUROLOGY | Facility: CLINIC | Age: 78
End: 2018-08-07

## 2018-08-07 NOTE — TELEPHONE ENCOUNTER
Jose Francisco Shannon w/[employer] states patient needs repairs on her wheelchair and she had sent a fax to us on 7/31  (verified she had correct fax number) It is a detailed product description and needs Dr Francisco J Quiroz  I was unable to locate in common drive or patient's chart  I requested she re fax to us

## 2018-08-09 ENCOUNTER — APPOINTMENT (OUTPATIENT)
Dept: PHYSICAL THERAPY | Facility: CLINIC | Age: 78
End: 2018-08-09
Payer: MEDICARE

## 2018-08-09 ENCOUNTER — OFFICE VISIT (OUTPATIENT)
Dept: OCCUPATIONAL THERAPY | Facility: CLINIC | Age: 78
End: 2018-08-09
Payer: MEDICARE

## 2018-08-09 DIAGNOSIS — G35 MULTIPLE SCLEROSIS (HCC): Primary | ICD-10-CM

## 2018-08-09 PROCEDURE — 97112 NEUROMUSCULAR REEDUCATION: CPT

## 2018-08-09 PROCEDURE — 97140 MANUAL THERAPY 1/> REGIONS: CPT

## 2018-08-09 NOTE — PROGRESS NOTES
Daily Note     Today's date: 2018  Patient name: Allyssa Burt  : 1940  MRN: [id number]  Referring provider: Ethel White PA-C  Dx:   Encounter Diagnosis   Name Primary?  Multiple sclerosis (HCC) Yes                  Subjective: "why are we doing that direction?"      Objective: See treatment diary below      Assessment: Tolerated treatment fair  Patient reported that she felt very fatigued after last session and requested to not have heat this session  Provided BIONESS to RUE for 10 minutes on neuro mod  Provided IASTM to bicep, forearm, and palm/digits for tone reduction-noted with tone in distal bicep and educated on massage techniques for home  PROM to RUE in all planes and utilized oval-8s for thumb and index extension  Educated on stretching and massage at home for increasing ROM and comfort  Plan: Continued skilled OT per POC      INTERVENTION COMMENTS:  [facility]: [facility] [G35]  Precautions: fall risk  FOTO:  5 of 10 visits, PN due

## 2018-08-10 ENCOUNTER — TELEPHONE (OUTPATIENT)
Dept: NEUROLOGY | Facility: CLINIC | Age: 78
End: 2018-08-10

## 2018-08-10 DIAGNOSIS — R25.2 SPASTICITY: Primary | ICD-10-CM

## 2018-08-10 NOTE — TELEPHONE ENCOUNTER
----- Message from Yolanda Gibson OT sent at 8/10/2018  7:58 AM EDT -----  Regarding: RE: Splint Request  Either or is fine  I think if you order it in epic you can order bracing and just put a comment as resting hand splint and elbow immobilization splint  Thank you!!      ----- Message -----  From: Nikhil Stallworth PA-C  Sent: 8/10/2018   7:54 AM  To: Genie Chapman OT  Subject: RE: Splint Request                               Hi Amita Valero,    Thanks for the recommendations for Rodolfo  Should I do a hand written script for these? Or do you know if I can do it in EPIC? If hand written, should I just fax it over to you? Thanks,  [facility]   ----- Message -----  From: Yolanda Gibson OT  Sent: 8/9/2018   9:18 PM  To: Nikhil Stallworth PA-C  Subject: Splint Request                                   [facility], [place],  I have the pleasure of seeing Saida Jhoana for outpt OT for her MS RUE management  She would benefit greatly from a resting hand splint for nighttime use and an elbow immobilization splint to prevent further spasticity and reduce tone  If agreeable, can you please write a script for these two splints and we will call our orthotics team to get her fitted? Thank you for the consult!   Please call if you have any questions: [phone]  Mateo Santiago, OTSANTIGAO, OTR/L, C-GCM, CSRS  Director of Outpatient Neuro Occupational Therapy

## 2018-08-13 ENCOUNTER — APPOINTMENT (OUTPATIENT)
Dept: PHYSICAL THERAPY | Facility: CLINIC | Age: 78
End: 2018-08-13
Payer: MEDICARE

## 2018-08-13 ENCOUNTER — OFFICE VISIT (OUTPATIENT)
Dept: OCCUPATIONAL THERAPY | Facility: CLINIC | Age: 78
End: 2018-08-13
Payer: MEDICARE

## 2018-08-13 DIAGNOSIS — G35 MULTIPLE SCLEROSIS (HCC): ICD-10-CM

## 2018-08-13 PROCEDURE — G8979 MOBILITY GOAL STATUS: HCPCS | Performed by: PHYSICAL THERAPIST

## 2018-08-13 PROCEDURE — 97112 NEUROMUSCULAR REEDUCATION: CPT

## 2018-08-13 PROCEDURE — 97140 MANUAL THERAPY 1/> REGIONS: CPT

## 2018-08-13 PROCEDURE — G8980 MOBILITY D/C STATUS: HCPCS | Performed by: PHYSICAL THERAPIST

## 2018-08-13 NOTE — PROGRESS NOTES
Pt d/c'ed at this time to only OT services  OT able to complete shoulder and elbow goals from PT so pt only has to come to one appt  Pt not interested in any other PT services at this time  Goals not met due to unanticipated d/c

## 2018-08-13 NOTE — PROGRESS NOTES
Occupational Therapy Daily Note:     Today's date: 2018  Patient name: Neri Rosas  : 1940  MRN: [id number]  Referring provider: Liz Mejia PA-C  Dx:   Encounter Diagnosis   Name Primary?  Multiple sclerosis (HCC)      Subjective: "I was able to make the sign of the cross the other day at [facility] so I was excited to be able to do that"  Objective: See treatment diary below  Assessment: Pt seen for OT treatment session focusing on distal Bioness x10 minutes for neuro modulation neuro stimulation, x10 minutes proximal NMES for neuromodulation and tone reduction  Pt tolerated distal IASTM to wrist flexors/extensors, MCP/PIP/DIPs, and bicpes, manual massage for trap for tone reduction w/ G tolerance  Pt tolerated PROM supination/pronation, wriest flexion/extension, elbow extension, and shoulder flexion/extension, internal/external rotation, horizontal ab/adduction w/ G tolerance  Discussed w/ pt order placed for RUE resting hand splint for pm use and for elbow immobilization splint for contracture prevention to promote extension and fx'l positioning  Pt aware and agreeable  [facility] scheduled for measurements  at 2pm session  Pt continues to demonstrate  limited 2* RUE spasticity w/ AROM shoulder flexion limited to <25%, distal spastic flexor synergy nonfunctional w/ impaired FMC/FMS/GMC/GMS, impaired prehension patterns, LUE intention tremors, decreased endurance/activity tolerance, generalized weakness, deconditioning, SOB, BLANCO, fatigue, fall risk, impaired balance, generalized spasticity, nonambulatory, kyphotic anterior forward flexed posture  Tolerated treatment well   Patient would benefit from continued skilled OT      Plan: Continued skilled OT per POC with focus on LUE tone reduction, fx'l LUE use t/o I/ADL/leisure tasks, FMC/FMS/GMC/GMS      INTERVENTION COMMENTS:  Diagnosis: Multiple sclerosis ([facility] [address] ) [G35]  Precautions: fall risk  FOTO: 5 with 95% limitation  Insurance: MEDICARE A AND B [[id number]]  6 of 10 visits, PN due 8/16/2018     Thank you for the consult!   Please call if you have any questions: [phone]  Linda Lambert, GERBER, OTR/L, C-GCM, CSRS  Director of Outpatient Neuro Occupational Therapy

## 2018-08-15 NOTE — TELEPHONE ENCOUNTER
Daria Reyes called checking status  I advised her that we have not received fax  I had her refax to [employer] fax #  Form left on MS NavigatorDanae's desk [facility]

## 2018-08-16 ENCOUNTER — OFFICE VISIT (OUTPATIENT)
Dept: OCCUPATIONAL THERAPY | Facility: CLINIC | Age: 78
End: 2018-08-16
Payer: MEDICARE

## 2018-08-16 ENCOUNTER — APPOINTMENT (OUTPATIENT)
Dept: PHYSICAL THERAPY | Facility: CLINIC | Age: 78
End: 2018-08-16
Payer: MEDICARE

## 2018-08-16 DIAGNOSIS — G35 MULTIPLE SCLEROSIS (HCC): Primary | ICD-10-CM

## 2018-08-16 PROCEDURE — 97112 NEUROMUSCULAR REEDUCATION: CPT

## 2018-08-16 PROCEDURE — 97140 MANUAL THERAPY 1/> REGIONS: CPT

## 2018-08-16 NOTE — PROGRESS NOTES
Daily Note     Today's date: 2018  Patient name: Allyssa Butr  : 1940  MRN: [id number]  Referring provider: Ethel White PA-C  Dx:   Encounter Diagnosis   Name Primary?  Multiple sclerosis (HCC) Yes                   Subjective: "My  noticed my arm is easier to move when getting me dressed "      Objective: See treatment diary below      Assessment: Tolerated treatment well  BIONESS to RUE for 10 minutes while provided STM to upper trap  IASTM to RUE for tone reduction followed by PROM to all joints for increasing functional range for dressing  OTR and KRAFT discussed with patient decreasing sessions to 45 minutes for appropriateness of manual treatment sessions  Patient in agreement with this  Plan: Continued skilled OT per POC      INTERVENTION COMMENTS:  Diagnosis: Multiple sclerosis ([facility] [address] ) [G35]  Precautions: fall risk  FOTO:  6  of 10 visits, PN due

## 2018-08-20 ENCOUNTER — APPOINTMENT (OUTPATIENT)
Dept: PHYSICAL THERAPY | Facility: CLINIC | Age: 78
End: 2018-08-20
Payer: MEDICARE

## 2018-08-20 ENCOUNTER — OFFICE VISIT (OUTPATIENT)
Dept: OCCUPATIONAL THERAPY | Facility: CLINIC | Age: 78
End: 2018-08-20
Payer: MEDICARE

## 2018-08-20 DIAGNOSIS — G35 MULTIPLE SCLEROSIS (HCC): Primary | ICD-10-CM

## 2018-08-20 PROCEDURE — 97112 NEUROMUSCULAR REEDUCATION: CPT

## 2018-08-20 PROCEDURE — 97140 MANUAL THERAPY 1/> REGIONS: CPT

## 2018-08-20 NOTE — PROGRESS NOTES
Daily Note     Today's date: 2018  Patient name: Isaias Aguilar  : 1940  MRN: [id number]  Referring provider: Ector Gan PA-C  Dx:   Encounter Diagnosis   Name Primary?  Multiple sclerosis (HCC) Yes                  Subjective: "The other day I was able to turn off a light switch with my right hand! I couldn't do that before "      Objective: See treatment diary below      Assessment: Tolerated treatment well  BIONESS to RUE for 10min on neuromod for motor/sensory re-ed, while therapist provided STM to R upper trap  IASTM to RUE from deltoid distal to digits for tone reduction with min improvement noted  PROM to RUE from scapula distal to digits to improve functional ROM  Pt tolerated well, noted with min improvement in range and tone reduction  Plan: Continue skilled OT per POC with focus on RUE tone reduction, ROM, motor/sensory re-ed, and functional use      INTERVENTION COMMENTS:  [facility]: [facility] Yvon Lynch  Precautions: fall risk  FOTO: 5 with 95% limitation  Insurance: MEDICARE A AND B [[id number]]  8 of 10 visits, PN due 2018

## 2018-08-23 ENCOUNTER — OFFICE VISIT (OUTPATIENT)
Dept: OCCUPATIONAL THERAPY | Facility: CLINIC | Age: 78
End: 2018-08-23
Payer: MEDICARE

## 2018-08-23 ENCOUNTER — APPOINTMENT (OUTPATIENT)
Dept: PHYSICAL THERAPY | Facility: CLINIC | Age: 78
End: 2018-08-23
Payer: MEDICARE

## 2018-08-23 DIAGNOSIS — G35 MULTIPLE SCLEROSIS (HCC): Primary | ICD-10-CM

## 2018-08-23 PROCEDURE — 97112 NEUROMUSCULAR REEDUCATION: CPT | Performed by: OCCUPATIONAL THERAPIST

## 2018-08-23 PROCEDURE — 97140 MANUAL THERAPY 1/> REGIONS: CPT | Performed by: OCCUPATIONAL THERAPIST

## 2018-08-23 NOTE — PROGRESS NOTES
Daily Note     Today's date: 2018  Patient name: Isaias Aguilar  : 1940  MRN: [id number]  Referring provider: Ector Gan PA-C  Dx: No diagnosis found  Subjective: "I don't mind heat if it's cold       Objective: See treatment description below      Assessment: Tolerated treatment fair  Patient would benefit from continued OT    [address] with BIONESS donned on neuro mod setting-- Pt tolerated x 10 minutes with no redness or skin irritation with skin check  MFR/STM to R trap with self-report of decreased stiffness after Manual Tx  IASTM to RUE from bicep distal to digits for tone reduction  Pt with Min griddy feel in hand/digits with significant decreased tone in digits evident-- OTR able to passively range digit to full range after manual tx  PROM to RUE from scapula distal to digits to improve functional ROM, increased engagement in ADL engagement, and increased engagement in meaningful occupations  Pt reports G improvements evident with decreased stiffness reported  OTR recommending Pt's  to attend tx session to carryover tone reduction strategies performed for improved functional progression  Plan: Continue per plan of care          INTERVENTION COMMENTS:  Diagnosis: Multiple sclerosis ([facility] [address] ) Yvon Lynch  Precautions: fall risk  FOTO: 5 with 95% limitation  Insurance: MEDICARE A AND B [[id number]]  9 of 10 visits, PN due 2018

## 2018-08-27 ENCOUNTER — APPOINTMENT (OUTPATIENT)
Dept: PHYSICAL THERAPY | Facility: CLINIC | Age: 78
End: 2018-08-27
Payer: MEDICARE

## 2018-08-30 ENCOUNTER — EVALUATION (OUTPATIENT)
Dept: OCCUPATIONAL THERAPY | Facility: CLINIC | Age: 78
End: 2018-08-30
Payer: MEDICARE

## 2018-08-30 ENCOUNTER — APPOINTMENT (OUTPATIENT)
Dept: PHYSICAL THERAPY | Facility: CLINIC | Age: 78
End: 2018-08-30
Payer: MEDICARE

## 2018-08-30 DIAGNOSIS — G35 MULTIPLE SCLEROSIS (HCC): Primary | ICD-10-CM

## 2018-08-30 PROCEDURE — 97535 SELF CARE MNGMENT TRAINING: CPT | Performed by: OCCUPATIONAL THERAPIST

## 2018-08-30 PROCEDURE — G8991 OTHER PT/OT GOAL STATUS: HCPCS | Performed by: OCCUPATIONAL THERAPIST

## 2018-08-30 PROCEDURE — 97112 NEUROMUSCULAR REEDUCATION: CPT | Performed by: OCCUPATIONAL THERAPIST

## 2018-08-30 PROCEDURE — G8990 OTHER PT/OT CURRENT STATUS: HCPCS | Performed by: OCCUPATIONAL THERAPIST

## 2018-08-30 NOTE — PROGRESS NOTES
Occupational Therapy Multiple Sclerosis Evaluation:    Today's Date: 2018  Patient Name: Seymour Terrell  : 1940  MRN: [id number]  Referring Provider: Chelle Carrion PA-C  Dx: No primary diagnosis found  Active Problem List:   Patient Active Problem List   Diagnosis    Benign essential tremor    Diabetes mellitus type 2, controlled, without complications ([facility] )    Hyperlipidemia    Hypertension, essential    Multiple sclerosis ([facility] )    Neurogenic bladder    Osteoporosis    Paresthesias    Spasticity     Past Medical Hx:   Past Medical History:   Diagnosis Date    Diabetes mellitus ([facility] )     Hyperlipidemia     Hypertension     Multiple sclerosis ([facility] )     Paresthesias      Past Surgical Hx:   Past Surgical History:   Procedure Laterality Date    HYSTERECTOMY        Pain Levels:  Restin    With Activity:  0    Subjective/Patient Goal: "To work on this hand a little bit"    History of Present Illness:  Pt is a pleasant, active, retired 68 y o  female seen for OT eval s/p [facility] s/p follow up with neurology for long standing h/o MS, initially dx'd in , now c/o increased facial paresthesias, optic neuritis, difficulty walking, spasticity in RUE, difficulty managing electric w/c controls, also recommended for botox injections w/ PMR c/s, though pt hesitant, now interested in pursuing OT/PT first for more conservative means prior to pursuing PMR c/s, neuro cont'd w/ gabapentin, baclofen, and tizanidine scripts, dx'd w/ MS, spasticity, comorbidities as listed above      Lifestyle Performance Model:  Autonomy: Pt was I w/ UB dressing, max A for LB ADLs/self care, max A for bed bath sponge bathing, max A for catheter management bedpan, shower chair, BSC, grab bars, SPC, QC, RW, HHC, outpt PT, inpt rehab at [facility] in [facility], transfer board w/ gait belt for dependent lateral transfers, has not ambulated since , primarily power scooter/electric w/c bound, tilt in space w/ gel [place] cushion  Reciprocal Relationships: Supportive  Tara Donaldson  for 54 years , 2 grown children 2 grandchildren  Service to Others: Pt is retired from [employer], worked in [employer]  Intrinsic Gratification: Enjoys doing sudoku, puzzles, reading, playing cards, crossword/jigaw puzzles  Home Setup: [facility] [address] [place] w/  Nonah Freed [address] w/ 0 JEREL w/ first floor setup handicapped accessible    Objective     Functional Assessment     Comments  See impairment section for further details  Impairments Section:   UE Strength:   MONY: RUE: 2/200 LUE: 25/200    R 1, L 29   PINCER: 3 point pinch: RUE unable to complete, LUE:8  remains   2 point pinch: RUE: unable to complete, LUE:5 remains   Lateral pincher: RUE: 2, LUE: 8  R 0 5, L 6    Coordination:   9 HOLE PEG TEST:     RUE: unable to complete seconds, LUE: 50 2 Seconds    Range of Motion:  AROM/PROM: RUE spasticity w/ AROM shoulder flexion limited to <25%, distal spastic flexor synergy nonfunctional w/ impaired FMC/FMS/GMC/GMS, impaired prehension patterns    R Seated PROM:  Shoulder FF: slightly past 1/2  Shoulder ABD: 1/2  Elbow Ext: near full  Elbow flexion: full  Wrist Ext: full  Wrist Flex: full  Sup: full  Pron: full  Digit ext: full     R seated AROM:  Shoulder elevation: 3/4  Elbow flexion: full  Elbow Ext: 3/4   Shoulder FF: Near 1/2 with short lever  Shoulder ABD: 1/4 with short lever  Wrist Ext: neutral  Wrist Flexion: full  Supination: full  Pronation: full     R handed primarily, LUE intention tremors reports is unrelated to MS reports "doctor said it's familial"  Sensation:  MYOFILAMENTS:   RUE: 3 61   LUE: 3 61    Visual Perceptual and Functional Cognition:  1   Brian Cognitive Assessment Version 8 1 (MoCA V8 1)  Visuospatial/executive functionin/5  Naming: 3/3  Memory: 1st trial: 5/5, 2nd trial: 5/5  Attention/concentration: 2/2  List of letters: /  Serial Seven Subtraction: 3/3 w/ 0 errors  Language/sentence repetition: 2/2  Language Fluency: 0/1  Abstract/Correlational Thinkin  Delayed Recall: 5  Orientation:    Memory Index Score: 15/15  MoCA V1 8 1 Raw Score: 28/30, MIS: 15/15, indicative of normal neurocognitive functioning  2  Vision Screening Recording Form: Pt denies visual changes, reports recent opthalmologist appt cleared eyes also, denies /VM impairments, + glasses @ all times present for vision screen  Assessment    Assessment details: See skilled analysis for further details  Occupational Therapy Skilled Analysis Assessment and Plan of Care:  Pt requires overall max A for ADLs/self care and max Ax1 for fx'l slide board tfers on/off all surfaces, nonambulatory electric w/c bound  Pt is currently demonstrating the following occupational deficits: limited 2* RUE spasticity w/ AROM shoulder flexion limited to <25%, distal spastic flexor synergy nonfunctional w/ impaired FMC/FMS/GMC/GMS, impaired prehension patterns, LUE intention tremors, decreased endurance/activity tolerance, generalized weakness, deconditioning, SOB, BLANCO, fatigue, fall risk, impaired balance, generalized spasticity, nonambulatory, kyphotic anterior forward flexed posture  The following Occupational Performance Areas to address include: eating, grooming, bathing/shower, toilet hygiene, dressing, medication management, socialization, health maintenance, functional mobility, community mobility, clothing management, cleaning, meal prep, money management, household maintenance, care of children, care of pets, job performance/volunteering and social participation  Based on the aforementioned OT evaluation, functional performance deficits, and assessments, pt has been identified as a high complexity evaluation   Pt to continue to benefit from outpatient skilled OT services to address the following goals 2x/wk to  w/in 4 week trial with special focus on LUE tone reduction, self-feeding, grooming, AE, LUE distal strengthening and fx'l use t/o I/ADL/leisure tasks  Pt demo with fair + functional progression towards goals in POC  Pt with self-report of increased abilities to range RUE more efficiently to improve bed mobility, decreased tone with increased abilities to passively range hand/digits, increased abilities to make the sign of the cross at [place], and increased abilities to turn bathroom switch on with RUE  Pt with improved AROM/PROM with less stiffness/tone evident  Jonah Pederson attended Re-Evaluation on this date fitting Pt for both R RHS and R dynamic elbow splint to improved LPS  OTR recommending Pt to continue skilled OT services 2x/week for 2-4 more weeks with focus on education to Pt's  on manual techniques, understanding/carryover of wear schedule once receiving RHS and elbow dynamic splint, and to continue to reduce tone for improved PROM/AROM        Goals:  Short Term Goals=Long Term Goals:  Tone:  Pt will demo with decreased RUE  hypertonicity for improved grasp release, termination of flexors on command  50% of time 4 weeks-- NOT MET  Pt will demo good carryover of clinic and home tone reduction strategies for improved AROM initiation with functional reach, dressing, hygiene 4 weeks-- PARTIALLY MET  Modalities:  Pt will tolerate BIONESS for improved motor and sensory performance for overall improved hand to target with 80% accuracy 4 weeks-- NOT MET  Pt will tolerate RHS or [place] Stretch x 6-8 hours for tendon elongation, decreased wrist drop and decreased tone in R hand-- PARTIALLY MET  Coordination:  Pt will increase prehension patterns for improved tripod with utensil management with <20% droppage 4 weeks-- PARTIALLY MET  Pt will increase proprioception of  R hand to target for improved functional reach vision occluded with ADL tasks 4 weeks-- PARTIALLY MET  Pt will increase automaticity of R  to 50% for improved grasp release of tabletop items for improved functional performance with salient tasks 4 weeks--- NOT MET  ROM/Function:  Pt will increase R  to functional assist with <20% cuing for tabletop tasks 4 weeks-- PARTIALLY MET  Pt will increase R  UE to Gross Assist, refined assist, and stabilization with <20% cuing for tabletop tasks 4 weeks-- MET  Pt will demo with G carryover of Home Exercise Program to improve functional progression towards goals in Plan of care and for improved functional use of  R 4 weeks-- PARTIALLY MET  Pt will increase RUE  to refined functional assist with <20% cuing for tabletop tasks for improved functional performance of life roles and salient tasks 4 week-- NOT MET     INTERVENTION COMMENTS:  Diagnosis: Multiple Sclerosis  Precautions: fall risk  FOTO: 5 with 95% limitation  Insurance: MEDICARE A AND B [[id number]]  19 of 10 visits, PN due 09/30/2018

## 2018-09-04 ENCOUNTER — OFFICE VISIT (OUTPATIENT)
Dept: OCCUPATIONAL THERAPY | Facility: CLINIC | Age: 78
End: 2018-09-04
Payer: COMMERCIAL

## 2018-09-04 DIAGNOSIS — G35 MULTIPLE SCLEROSIS (HCC): Primary | ICD-10-CM

## 2018-09-04 PROCEDURE — 97112 NEUROMUSCULAR REEDUCATION: CPT

## 2018-09-04 PROCEDURE — 97140 MANUAL THERAPY 1/> REGIONS: CPT

## 2018-09-04 NOTE — PROGRESS NOTES
Daily Note     Today's date: 2018  Patient name: Arina Eid  : 1940  MRN: [id number]  Referring provider: Doloris Cushing, PA-C  Dx:   Encounter Diagnosis   Name Primary?  Multiple sclerosis ([place] [address] ) Yes                  Subjective: "I did it!"      Objective: See treatment diary below      Assessment: Tolerated treatment well  NMES to right forearm for wrist extension while providing STM to upper trap  IASTM to all digits and palm  Oval 8's to thumb and index finger for extension-focused on c-pinch  Patient was then able to pinch checkers on table top          Plan: Continued skilled OT per POC    INTERVENTION COMMENTS:  Diagnosis: Multiple sclerosis ([place] [address] ) [G35]  Precautions: fall risk  FOTO:  1 of 10 visits, PN due

## 2018-09-04 NOTE — PROGRESS NOTES
Daily Note     Today's date: 9/3/2018  Patient name: Sharan Feng  : 1940  MRN: [id number]  Referring provider: Nathaly Khan PA-C  Dx: No diagnosis found  Subjective: ***    Objective: See treatment diary below    Assessment: Pt seen for OT treatment session focusing on ***    Pt continues to demonstrate  limited 2* RUE spasticity w/ AROM shoulder flexion limited to <25%, distal spastic flexor synergy nonfunctional w/ impaired FMC/FMS/GMC/GMS, impaired prehension patterns, LUE intention tremors, decreased endurance/activity tolerance, generalized weakness, deconditioning, SOB, BLANCO, fatigue, fall risk, impaired balance, generalized spasticity, nonambulatory, kyphotic anterior forward flexed posture  Tolerated treatment well  Patient would benefit from continued skilled OT      Plan: Continued skilled OT per POC with focus on LUE tone reduction, fx'l LUE use t/o I/ADL/leisure tasks, FMC/FMS/GMC/GMS  INTERVENTION COMMENTS:  Diagnosis: Multiple Sclerosis  Precautions: fall risk  FOTO: 5 with 95% limitation  Insurance: MEDICARE A AND B [[id number]]  5 of 10 visits, PN due 2018     Thank you for the consult!   Please call if you have any questions: [phone]  GERBER Hicks, OTR/L, C-GCM, CSRS  Director of Outpatient Neuro Occupational Therapy

## 2018-09-07 ENCOUNTER — OFFICE VISIT (OUTPATIENT)
Dept: OCCUPATIONAL THERAPY | Facility: CLINIC | Age: 78
End: 2018-09-07
Payer: COMMERCIAL

## 2018-09-07 DIAGNOSIS — G35 MULTIPLE SCLEROSIS (HCC): Primary | ICD-10-CM

## 2018-09-07 PROCEDURE — 97140 MANUAL THERAPY 1/> REGIONS: CPT

## 2018-09-07 PROCEDURE — 97112 NEUROMUSCULAR REEDUCATION: CPT

## 2018-09-07 NOTE — PROGRESS NOTES
Daily Note     Today's date: 2018  Patient name: Kat Foreman  : 1940  MRN: [id number]  Referring provider: Eliana Peralta PA-C  Dx:   Encounter Diagnosis   Name Primary?  Multiple sclerosis (HCC) Yes                  Subjective: "I think I could grasp better without those things on my fingers"  Objective: See treatment diary below      Assessment: Tolerated treatment well  Patient would benefit from continued OT   Pt tolerated NMES to forearm and shoulder w/MH for tone reduction followed by IASTM to traps and RUE  Decrease in tone noted w/BIONESS to digits near full extension  PROM near 3/4 elbow extension post IASTM to bicep tendon  PROM to RUE, all planes  OTR/L educated/discussed dynamic elbow splint w/pt and spouse       Plan: Continued skilled OT per POC    INTERVENTION COMMENTS:  Diagnosis: Multiple sclerosis ([facility] ) [G35]  Precautions: fall risk  FOTO:  2 of 10 visits, PN due

## 2018-09-11 ENCOUNTER — OFFICE VISIT (OUTPATIENT)
Dept: OCCUPATIONAL THERAPY | Facility: CLINIC | Age: 78
End: 2018-09-11
Payer: COMMERCIAL

## 2018-09-11 DIAGNOSIS — G35 MULTIPLE SCLEROSIS (HCC): Primary | ICD-10-CM

## 2018-09-11 PROCEDURE — 97140 MANUAL THERAPY 1/> REGIONS: CPT

## 2018-09-11 PROCEDURE — 97112 NEUROMUSCULAR REEDUCATION: CPT

## 2018-09-11 NOTE — PROGRESS NOTES
Daily Note     Today's date: 2018  Patient name: Gracia Elias  : 1940  MRN: [id number]  Referring provider: Fanny Dias PA-C  Dx:   Encounter Diagnosis   Name Primary?  Multiple sclerosis (HCC) Yes                  Subjective: "The last four days or so I feel like all my hands do is curl "      Objective: See treatment diary below    Assessment: Tolerated treatment fair  Patient would benefit from continued OT   Pt tolerated NMES to R shoulder w/MH for tone reductions simultaneously w/BIONESS to right hand followed by IASTM focusing on tone reduction to bicep and bicep tendon to facilitate elbow extension  [place] splint to R hand to sustain digits in extension, min improvement noted  PROM to RUE,  shoulder abduction near 1/2 range pain free, w/ER         Plan: Continued skilled OT per POC    INTERVENTION COMMENTS:  Diagnosis: [facility] [G35]  Precautions: fall risk  FOTO:  3 of 10 visits, PN due

## 2018-09-12 NOTE — PROGRESS NOTES
Occupational Therapy Daily Note:    Today's date: 2018  Patient name: Candy Topete  : 1940  MRN: [id number]  Referring provider: Cisco Leslie PA-C  Dx:   Encounter Diagnosis   Name Primary?  Multiple sclerosis (HCC) Yes     Subjective: "I feel like I can open my fingers now a lot easier but they just won't stay"  Objective: See treatment diary below  Assessment: Pt seen for OT treatment session focusing on distal neuro modulation w/ Bioness for neuro sensory/motor re-education x10 minutes w/ G tolerance, proximal NMES w/ MH x10 minutes for tone reduction  Pt tolerated IASTM proximally/distally for tone reduction and manual massage to biceps/triceps, wrist flexors/extensors, MCP/DIP/PIP, trap, supra/infraspinatus, donned sandwich splint distally to sustain MCP/DIP/PIP extension, min improvement/carrover  Call placed to [place] to inquire status of elbow immobilization splint and RHS, RHS in per [facility] orthotics though elbow fabriation will take 18-25 business days,pt is slated to go to [facility] mid October  [facility] coming on last OT re-eval for d/c on  at 1pm to fit pt w/ RHS, pt aware will need to go to the office when elbow immobilization splint in, aware and agreeable for both process for getting elbow immobilization splint, reception of RHS on , and OT re-eval for d/c on   Pt continues to demonstrate  limited 2* RUE spasticity w/ AROM shoulder flexion limited to <25%, distal spastic flexor synergy nonfunctional w/ impaired FMC/FMS/GMC/GMS, impaired prehension patterns, LUE intention tremors, decreased endurance/activity tolerance, generalized weakness, deconditioning, SOB, BLANCO, fatigue, fall risk, impaired balance, generalized spasticity, nonambulatory, kyphotic anterior forward flexed posture  Tolerated treatment well   Patient would benefit from continued skilled OT      Plan: Continued skilled OT per POC with focus on LUE tone reduction, fx'l LUE use t/o I/ADL/leisure tasks, FMC/FMS/GMC/GMS  INTERVENTION COMMENTS:  Diagnosis: Multiple sclerosis ([facility] ) [G35]  Precautions: fall risk  FOTO: 5 with 95% limitation  Insurance: MEDICARE A AND B [[id number]]  3 of 10 visits, PN due 9/30/2018     Thank you for the consult!   Please call if you have any questions: [phone]  Neli Person, OTD, OTR/L, C-GCM, CSRS  Director of Outpatient Neuro Occupational Therapy

## 2018-09-13 ENCOUNTER — OFFICE VISIT (OUTPATIENT)
Dept: OCCUPATIONAL THERAPY | Facility: CLINIC | Age: 78
End: 2018-09-13
Payer: COMMERCIAL

## 2018-09-13 DIAGNOSIS — G35 MULTIPLE SCLEROSIS (HCC): Primary | ICD-10-CM

## 2018-09-13 PROCEDURE — G8990 OTHER PT/OT CURRENT STATUS: HCPCS

## 2018-09-13 PROCEDURE — G8991 OTHER PT/OT GOAL STATUS: HCPCS

## 2018-09-13 PROCEDURE — 97140 MANUAL THERAPY 1/> REGIONS: CPT

## 2018-09-13 PROCEDURE — 97112 NEUROMUSCULAR REEDUCATION: CPT

## 2018-09-17 ENCOUNTER — OFFICE VISIT (OUTPATIENT)
Dept: OCCUPATIONAL THERAPY | Facility: CLINIC | Age: 78
End: 2018-09-17
Payer: COMMERCIAL

## 2018-09-17 DIAGNOSIS — G35 MULTIPLE SCLEROSIS (HCC): Primary | ICD-10-CM

## 2018-09-17 PROCEDURE — 97140 MANUAL THERAPY 1/> REGIONS: CPT

## 2018-09-17 PROCEDURE — 97112 NEUROMUSCULAR REEDUCATION: CPT

## 2018-09-17 NOTE — PROGRESS NOTES
Daily Note     Today's date: 2018  Patient name: Torsten Friend  : 1940  MRN: [id number]  Referring provider: Alexey Wilks PA-C  Dx:   Encounter Diagnosis   Name Primary?  Multiple sclerosis ([place] [address] ) Yes                  Subjective: "I guess it's getting better "      Objective: See treatment below  MH to [facility] with NMES to forearm for 10 minutes  IASTM to upper trap and palm  PROM to [facility] for horizontal abduction and ER         Assessment: Tolerated treatment well  Noted with increased tone in upper trap and presented with decreased tone following manual techniques         Plan: Continued skilled OT per POC    INTERVENTION COMMENTS:  Diagnosis: Multiple sclerosis ([place] [address] ) [G35]  Precautions: fall risk  FOTO:  5 of 10 visits, PN due

## 2018-09-20 ENCOUNTER — OFFICE VISIT (OUTPATIENT)
Dept: OCCUPATIONAL THERAPY | Facility: CLINIC | Age: 78
End: 2018-09-20
Payer: COMMERCIAL

## 2018-09-20 DIAGNOSIS — G35 MULTIPLE SCLEROSIS (HCC): Primary | ICD-10-CM

## 2018-09-20 PROCEDURE — 97140 MANUAL THERAPY 1/> REGIONS: CPT

## 2018-09-20 PROCEDURE — 97112 NEUROMUSCULAR REEDUCATION: CPT

## 2018-09-20 NOTE — PROGRESS NOTES
Daily Note     Today's date: 2018  Patient name: Win Dozier  : 1940  MRN: [id number]  Referring provider: Chantel Noland PA-C  Dx:   Encounter Diagnosis   Name Primary?  Multiple sclerosis (HCC) Yes                   Subjective: "I feel so loose "      Objective: See treatment below  Mh to shoulder while completing 10 minutes of neuro mod with BIONESS  IASTM to upper trap and palm/digits  PROM to [facility] joint for abduction  PROM to all digits  Assessment: Tolerated treatment well  Noted with decreased tone due to wearing resting hand splint at night  Has noted with no redness and KRAFT did not observer any redness or bruising during session        Plan: Continued skilled OT per POC    INTERVENTION COMMENTS:  Diagnosis: [facility] [G35]  Precautions: fall risk  FOTO:  6 of 10 visits, PN due

## 2018-09-24 ENCOUNTER — OFFICE VISIT (OUTPATIENT)
Dept: OCCUPATIONAL THERAPY | Facility: CLINIC | Age: 78
End: 2018-09-24
Payer: COMMERCIAL

## 2018-09-24 DIAGNOSIS — G35 MULTIPLE SCLEROSIS (HCC): Primary | ICD-10-CM

## 2018-09-24 PROCEDURE — 97140 MANUAL THERAPY 1/> REGIONS: CPT

## 2018-09-24 NOTE — PROGRESS NOTES
Daily Note     Today's date: 2018  Patient name: Jordin López  : 1940  MRN: [id number]  Referring provider: Romain Cason PA-C  Dx:   Encounter Diagnosis   Name Primary?  Multiple sclerosis ([place] [address] ) Yes                  Subjective: "I have good days and I have bad days "      Objective: See treatment below  MH shoulder with NMES to forearm for wrist and digit extension  IASTM to upper trap and digits followed by PROM to RUE  Assessment: Tolerated treatment well  Noted with decreased tone in RUE and improvement in digit extension         Plan: Continued skilled OT per POC with transition to HEP and DC    INTERVENTION COMMENTS:  Diagnosis: Multiple sclerosis ([place] [address] ) [G35]  Precautions: fall risk  FOTO:  7 of 10 visits, PN due

## 2018-09-26 NOTE — PROGRESS NOTES
Occupational Therapy MS Progress Note/Status Update: Today's Date: 2018  Patient Name: Jeancarlos Gonzalez  : 1940  MRN: [id number]  Referring Provider: Karen Eaton PA-C  Dx: No primary diagnosis found      Active Problem List:   Patient Active Problem List   Diagnosis    Benign essential tremor    Diabetes mellitus type 2, controlled, without complications ([facility] )    Hyperlipidemia    Hypertension, essential    Multiple sclerosis ([facility] )    Neurogenic bladder    Osteoporosis    Paresthesias    Spasticity     Past Medical Hx:   Past Medical History:   Diagnosis Date    Diabetes mellitus ([facility] )     Hyperlipidemia     Hypertension     Multiple sclerosis ([facility] )     Paresthesias      Past Surgical Hx:   Past Surgical History:   Procedure Laterality Date    HYSTERECTOMY          Pain Levels:   Resting:  {gen number [id number]}    With Activity:  {gen number [id number]}    Subjective/Patient Goal: "***"    History of Present Illness:  Pt is a pleasant, active, retired 68 y o  female seen for OT eval s/p referred to [address] [place] [address] s/p follow up with neurology for long standing h/o MS, initially dx'd in , now c/o increased facial paresthesias, optic neuritis, difficulty walking, spasticity in RUE, difficulty managing electric w/c controls, also recommended for botox injections w/ PMR c/s, though pt hesitant, now interested in pursuing OT/PT first for more conservative means prior to pursuing PMR c/s, neuro cont'd w/ gabapentin, baclofen, and tizanidine scripts, dx'd w/ MS, spasticity, comorbidities as listed above      Lifestyle Performance Model:  Autonomy: Pt was I w/ UB dressing, max A for LB ADLs/self care, max A for bed bath sponge bathing, max A for catheter management bedpan, shower chair, BSC, grab bars, SPC, QC, RW, HHC, outpt PT, inpt rehab at [facility] in [facility], transfer board w/ gait belt for dependent lateral transfers, has not ambulated since , primarily power scooter/electric w/c bound, tilt in space w/ gel Prosper cushion  Reciprocal Relationships: Supportive  Kaur Sires  for 54 years Friday July 20th, 2 grown children 2 grandchildren  Service to Others: Pt is retired from [employer], worked in [employer]  Intrinsic Gratification: Enjoys doing sudoku, puzzles, reading, playing cards, crossword/jigaw puzzles  Home Setup: Pt lives off [address] [place] [address] in [place] w/  Ronaldo Lyons w/ 0 JEREL w/ first floor setup handicapped accessible    Objective   Impairments Section:  Coordination:              9 HOLE PEG TEST:                RUE: unable to complete seconds, LUE: 50 2 Seconds     Range of Motion:  AROM/PROM: RUE spasticity w/ AROM shoulder flexion limited to <25%, distal spastic flexor synergy nonfunctional w/ impaired FMC/FMS/GMC/GMS, impaired prehension patterns     R handed primarily, LUE intention tremors reports is unrelated to MS reports "doctor said it's familial"  Sensation:  MYOFILAMENTS:              RUE: 3 61              LUE: 3 61    Visual Perceptual and Functional Cognition:  1  [place] Cognitive Assessment Version 8 1 (MoCA V8 1): Defer 2* scored 28/30 indicative of normal neurocognitive functioning    2  Vision Screening Recording Form: Pt denies visual changes, reports recent opthalmologist appt cleared eyes also, denies /VM impairments, + glasses @ all times present for vision screen  Assessment/Plan  Occupational Therapy Skilled Analysis Assessment and Plan of Care:  Pt requires overall max A for ADLs/self care and max Ax1 for fx'l slide board tfers on/off all surfaces, nonambulatory electric w/c bound   Pt is currently demonstrating the following occupational deficits: limited 2* RUE spasticity w/ AROM shoulder flexion limited to <25%, distal spastic flexor synergy nonfunctional w/ impaired FMC/FMS/GMC/GMS, impaired prehension patterns, LUE intention tremors, decreased endurance/activity tolerance, generalized weakness, deconditioning, SOB, BLANCO, fatigue, fall risk, impaired balance, generalized spasticity, nonambulatory, kyphotic anterior forward flexed posture  The following Occupational Performance Areas to address include: eating, grooming, bathing/shower, toilet hygiene, dressing, medication management, socialization, health maintenance, functional mobility, community mobility, clothing management, cleaning, meal prep, money management, household maintenance, care of children, care of pets, job performance/volunteering and social participation  Based on the aforementioned OT evaluation, functional performance deficits, and assessments, pt has been identified as a high complexity evaluation  Pt to continue to benefit from outpatient skilled OT services to address the following goals 2x/wk to  w/in 4 week trial with special focus on LUE tone reduction, self-feeding, grooming, AE, LUE distal strengthening and fx'l use t/o I/ADL/leisure tasks  Pt seen for OT re-eval/progress note/status update  Pt continues to demonstrate ***  At this point, pt has met functional capacity, no further skillable outpt OT needs indicated, D/C from OT Caseload  Pt received RHS for tone reduction and is awaiting arrival of elbow immobilization splint through [place]  Pt to contact for fitting/comfort upon arrival  No further skillable outpt OT needs indicated  D/C from OT caseload, D/C OT   Pt aware and agreeable      Goals:  Short Term Goals=Long Term Goals:  · Tone:  · Pt will demo with decreased RUE  hypertonicity for improved grasp release, termination of flexors on command  50% of time 4 weeks-MET  · Pt will demo good carryover of clinic and home tone reduction strategies for improved AROM initiation with functional reach, dressing, hygiene 4 weeks-MET  · Modalities:  · Pt will tolerate BIONESS for improved motor and sensory performance for overall improved hand to target with 80% accuracy 4 weeks-MET  · Pt will tolerate RHS or Saebo Stretch x 6-8 hours for tendon elongation, decreased wrist drop and decreased tone in R hand-MET  · Coordination:  · Pt will increase prehension patterns for improved tripod with utensil management with <20% droppage 4 weeks-MET  · Pt will increase proprioception of  R hand to target for improved functional reach vision occluded with ADL tasks 4 weeks-MET  · Pt will increase automaticity of R  to 50% for improved grasp release of tabletop items for improved functional performance with salient tasks 4 weeks-MET  · ROM/Function:  · Pt will increase R  to functional assist with <20% cuing for tabletop tasks 4 weeks-MET  · Pt will increase R  UE to Gross Assist, refined assist, and stabilization with <20% cuing for tabletop tasks 4 weeks-MET  · Pt will demo with G carryover of Home Exercise Program to improve functional progression towards goals in Plan of care and for improved functional use of  R 4 weeks-MET  · Pt will increase RUE  to refined functional assist with <20% cuing for tabletop tasks for improved functional performance of life roles and salient tasks 4 week-MET     INTERVENTION COMMENTS:  Diagnosis: Multiple sclerosis ([facility] ) [G35]  Precautions: fall risk  FOTO:  ***  Insurance: MEDICARE A AND B [[id number]]  5 of 10 visits, PN N/A 2* D/C from Caseload     Thank you for the consult!   Please call if you have any questions: [phone]  GERBER Lagos, OTR/L, C-GCM, CSRS  Director of Outpatient Neuro Occupational Therapy

## 2018-09-27 ENCOUNTER — APPOINTMENT (OUTPATIENT)
Dept: OCCUPATIONAL THERAPY | Facility: CLINIC | Age: 78
End: 2018-09-27
Payer: COMMERCIAL

## 2018-10-04 ENCOUNTER — EVALUATION (OUTPATIENT)
Dept: OCCUPATIONAL THERAPY | Facility: CLINIC | Age: 78
End: 2018-10-04
Payer: MEDICARE

## 2018-10-04 DIAGNOSIS — G35 MULTIPLE SCLEROSIS (HCC): Primary | ICD-10-CM

## 2018-10-04 PROCEDURE — 97112 NEUROMUSCULAR REEDUCATION: CPT | Performed by: OCCUPATIONAL THERAPIST

## 2018-10-04 PROCEDURE — G8992 OTHER PT/OT  D/C STATUS: HCPCS | Performed by: OCCUPATIONAL THERAPIST

## 2018-10-04 PROCEDURE — G8991 OTHER PT/OT GOAL STATUS: HCPCS | Performed by: OCCUPATIONAL THERAPIST

## 2018-10-04 PROCEDURE — G8990 OTHER PT/OT CURRENT STATUS: HCPCS | Performed by: OCCUPATIONAL THERAPIST

## 2018-10-04 NOTE — PROGRESS NOTES
Occupational Therapy Discharge Summary:    Today's Date: 10/4/2018  Patient Name: Eufemia Villanueva  : 1940  MRN: [id number]  Referring Provider: Kiley Dyer PA-C  Dx: No primary diagnosis found  Active Problem List:   Patient Active Problem List   Diagnosis    Benign essential tremor    [facility] mellitus type 2, controlled, without complications ([facility] )    Hyperlipidemia    Hypertension, essential    Multiple sclerosis ([facility] )    Neurogenic bladder    Osteoporosis    Paresthesias    Spasticity     Past Medical Hx:   Past Medical History:   Diagnosis Date    Diabetes mellitus ([facility] )     Hyperlipidemia     Hypertension     Multiple sclerosis ([facility] )     Paresthesias      Past Surgical Hx:   Past Surgical History:   Procedure Laterality Date    HYSTERECTOMY        Pain Levels:  Restin    With Activity:  0    Subjective/Patient Goal: "To work on this hand a little bit"    History of Present Illness:  Pt is a pleasant, active, retired 66 y o  female seen for OT eval s/p referred to [address] [place] [address] s/p follow up with neurology for long standing h/o MS, initially dx'd in , now c/o increased facial paresthesias, optic neuritis, difficulty walking, spasticity in RUE, difficulty managing electric w/c controls, also recommended for botox injections w/ PMR c/s, though pt hesitant, now interested in pursuing OT/PT first for more conservative means prior to pursuing PMR c/s, neuro cont'd w/ gabapentin, baclofen, and tizanidine scripts, dx'd w/ MS, spasticity, comorbidities as listed above      Lifestyle Performance Model:  Autonomy: Pt was I w/ UB dressing, max A for LB ADLs/self care, max A for bed bath sponge bathing, max A for catheter management bedpan, shower chair, BSC, grab bars, SPC, QC, RW, HHC, outpt PT, inpt rehab at [facility] in [facility], transfer board w/ gait belt for dependent lateral transfers, has not ambulated since , primarily power scooter/electric w/c bound, tilt in space w/ gel Ricardo Brock karen  Reciprocal Relationships: Supportive  Davide Murillo  for 54 years Friday July 20th, 2 grown children 2 grandchildren  Service to Others: Pt is retired from [employer], worked in [employer]  Intrinsic Gratification: Enjoys doing sudoku, puzzles, reading, playing cards, crossword/jigaw puzzles  Home Setup: Pt lives off [address] in [place] w/  Zac [place] [address] w/ 0 JEREL w/ first floor setup handicapped accessible    Objective     Functional Assessment     Comments  See impairment section for further details  Impairments Section:   UE Strength:   MONY: RUE: 2/200 LUE: 25/200    R 1, L 29    R 1, L 36   PINCER: 3 point pinch: RUE unable to complete, LUE:8  R unable, L 5 5   2 point pinch: RUE: unable to complete, LUE:5 remains    R 0 5, L 5   Lateral pincher: RUE: 2, LUE: 8  R 0 5, L 6  R 3, L 7 5    Coordination:   9 HOLE PEG TEST:     RUE: unable to complete seconds, LUE: 50 2 Seconds    Range of Motion:  AROM/PROM: RUE spasticity w/ AROM shoulder flexion limited to <25%, distal spastic flexor synergy nonfunctional w/ impaired FMC/FMS/GMC/GMS, impaired prehension patterns    R Seated PROM:  Shoulder FF: slightly past 1/2    3/4  Shoulder ABD: 1/2    Near 3/4  Elbow Ext: near full    remains  Elbow flexion: full    remains  Wrist Ext: full  Wrist Flex: full  Sup: full  Pron: full  Digit ext: full     R seated AROM:  Shoulder elevation: 3/4    full  Elbow flexion: full    remains  Elbow Ext: 1/2  Shoulder FF: Near 1/2 with short lever  Shoulder ABD: 1/4 with short lever    Near 1/2 with short lever  Wrist Ext: neutral  remains  Wrist Flexion: full  Supination: full  Pronation: full     R handed primarily, LUE intention tremors reports is unrelated to MS reports "doctor said it's familial"  Sensation:  MYOFILAMENTS:   RUE: 3 61   LUE: 3 61    Visual Perceptual and Functional Cognition:  1   [place] Cognitive Assessment Version 8 1 (MoCA V8 1)  Visuospatial/executive functionin/5  Naming: 3/3  Memory: 1st trial: , 2nd trial:   Attention/concentration:   List of letters:   Serial Seven Subtraction: 3/3 w/ 0 errors  Language/sentence repetition: 2/2  Language Fluency: 0/1  Abstract/Correlational Thinkin/2  Delayed Recall:   Orientation:    Memory Index Score: 15/15  MoCA V1 8 1 Raw Score: 28/30, MIS: 15/15, indicative of normal neurocognitive functioning  2  Vision Screening Recording Form: Pt denies visual changes, reports recent opthalmologist appt cleared eyes also, denies /VM impairments, + glasses @ all times present for vision screen  Assessment    Assessment details: See skilled analysis for further details  Occupational Therapy Skilled Analysis Assessment and Plan of Care:  Pt requires overall max A for ADLs/self care and max Ax1 for fx'l slide board tfers on/off all surfaces, nonambulatory electric w/c bound  Pt is currently demonstrating the following occupational deficits: limited 2* RUE spasticity w/ AROM shoulder flexion limited to <25%, distal spastic flexor synergy nonfunctional w/ impaired FMC/FMS/GMC/GMS, impaired prehension patterns, LUE intention tremors, decreased endurance/activity tolerance, generalized weakness, deconditioning, SOB, BLANCO, fatigue, fall risk, impaired balance, generalized spasticity, nonambulatory, kyphotic anterior forward flexed posture  The following Occupational Performance Areas to address include: eating, grooming, bathing/shower, toilet hygiene, dressing, medication management, socialization, health maintenance, functional mobility, community mobility, clothing management, cleaning, meal prep, money management, household maintenance, care of children, care of pets, job performance/volunteering and social participation  Based on the aforementioned OT evaluation, functional performance deficits, and assessments, pt has been identified as a high complexity evaluation   Pt to continue to benefit from outpatient skilled OT services to address the following goals 2x/wk to  w/in 4 week trial with special focus on LUE tone reduction, self-feeding, grooming, AE, LUE distal strengthening and fx'l use t/o I/ADL/leisure tasks  Pt recently received RHS and still awaiting dynamic elbow splint from [facility]  OTR will be D/Cing Pt at this time 2* Goals Met and Maximal level reached  OTR educated Pt on the importance of notifying staff if complications of splint arise or if regression is evident  OTR also educated Pt on the importance of continuing HEP and tone reduction strategies in home environment        Goals:  Short Term Goals=Long Term Goals:  Tone:  Pt will demo with decreased RUE  hypertonicity for improved grasp release, termination of flexors on command  50% of time 4 weeks-- NOT MET  Pt will demo good carryover of clinic and home tone reduction strategies for improved AROM initiation with functional reach, dressing, hygiene 4 weeks-- PARTIALLY MET  Modalities:  Pt will tolerate BIONESS for improved motor and sensory performance for overall improved hand to target with 80% accuracy 4 weeks-- NOT MET  Pt will tolerate RHS or Saebo Stretch x 6-8 hours for tendon elongation, decreased wrist drop and decreased tone in R hand-- PARTIALLY MET  Coordination:  Pt will increase prehension patterns for improved tripod with utensil management with <20% droppage 4 weeks-- PARTIALLY MET  Pt will increase proprioception of  R hand to target for improved functional reach vision occluded with ADL tasks 4 weeks-- PARTIALLY MET  Pt will increase automaticity of R  to 50% for improved grasp release of tabletop items for improved functional performance with salient tasks 4 weeks--- NOT MET  ROM/Function:  Pt will increase R  to functional assist with <20% cuing for tabletop tasks 4 weeks-- PARTIALLY MET  Pt will increase R  UE to Gross Assist, refined assist, and stabilization with <20% cuing for tabletop tasks 4 weeks-- MET  Pt will demo with G carryover of Home Exercise Program to improve functional progression towards goals in Plan of care and for improved functional use of  R 4 weeks-- PARTIALLY MET  Pt will increase RUE  to refined functional assist with <20% cuing for tabletop tasks for improved functional performance of life roles and salient tasks 4 week-- NOT MET     INTERVENTION COMMENTS:  Diagnosis: Multiple Sclerosis  Precautions: fall risk  FOTO: 5 with 95% limitation    22, with 85% limitation in Hand function  Insurance: [id number] [[id number]]  8 of 10 visits

## 2018-10-05 ENCOUNTER — TELEPHONE (OUTPATIENT)
Dept: NEUROLOGY | Facility: CLINIC | Age: 78
End: 2018-10-05

## 2018-10-05 NOTE — TELEPHONE ENCOUNTER
Orders for wrist splints signed by Haley Bowen no number on forms to call or fax forms to  Scanned to CF

## 2018-10-11 ENCOUNTER — APPOINTMENT (OUTPATIENT)
Dept: LAB | Age: 78
End: 2018-10-11
Payer: MEDICARE

## 2018-10-11 ENCOUNTER — TRANSCRIBE ORDERS (OUTPATIENT)
Dept: ADMINISTRATIVE | Age: 78
End: 2018-10-11

## 2018-10-11 DIAGNOSIS — E13.9 DIABETES MELLITUS OF OTHER TYPE WITHOUT COMPLICATION, UNSPECIFIED WHETHER LONG TERM INSULIN USE (HCC): ICD-10-CM

## 2018-10-11 DIAGNOSIS — R30.0 DYSURIA: ICD-10-CM

## 2018-10-11 DIAGNOSIS — E78.2 MIXED HYPERLIPIDEMIA: ICD-10-CM

## 2018-10-11 DIAGNOSIS — E78.2 MIXED HYPERLIPIDEMIA: Primary | ICD-10-CM

## 2018-10-11 LAB
ALBUMIN SERPL BCP-MCNC: 3.4 G/DL (ref 3.5–5)
ALP SERPL-CCNC: 73 U/L (ref 46–116)
ALT SERPL W P-5'-P-CCNC: 27 U/L (ref 12–78)
ANION GAP SERPL CALCULATED.3IONS-SCNC: 3 MMOL/L (ref 4–13)
AST SERPL W P-5'-P-CCNC: 12 U/L (ref 5–45)
BILIRUB SERPL-MCNC: 0.35 MG/DL (ref 0.2–1)
BUN SERPL-MCNC: 19 MG/DL (ref 5–25)
CALCIUM SERPL-MCNC: 8.9 MG/DL (ref 8.3–10.1)
CHLORIDE SERPL-SCNC: 100 MMOL/L (ref 100–108)
CHOLEST SERPL-MCNC: 154 MG/DL (ref 50–200)
CO2 SERPL-SCNC: 29 MMOL/L (ref 21–32)
CREAT SERPL-MCNC: 0.57 MG/DL (ref 0.6–1.3)
EST. AVERAGE GLUCOSE BLD GHB EST-MCNC: 131 MG/DL
GFR SERPL CREATININE-BSD FRML MDRD: 89 ML/MIN/1.73SQ M
GLUCOSE P FAST SERPL-MCNC: 115 MG/DL (ref 65–99)
HBA1C MFR BLD: 6.2 % (ref 4.2–6.3)
HDLC SERPL-MCNC: 44 MG/DL (ref 40–60)
LDLC SERPL CALC-MCNC: 86 MG/DL (ref 0–100)
NONHDLC SERPL-MCNC: 110 MG/DL
POTASSIUM SERPL-SCNC: 4.4 MMOL/L (ref 3.5–5.3)
PROT SERPL-MCNC: 6.8 G/DL (ref 6.4–8.2)
SODIUM SERPL-SCNC: 132 MMOL/L (ref 136–145)
TRIGL SERPL-MCNC: 121 MG/DL

## 2018-10-11 PROCEDURE — 36415 COLL VENOUS BLD VENIPUNCTURE: CPT

## 2018-10-11 PROCEDURE — 87086 URINE CULTURE/COLONY COUNT: CPT

## 2018-10-11 PROCEDURE — 83036 HEMOGLOBIN GLYCOSYLATED A1C: CPT

## 2018-10-11 PROCEDURE — 87186 SC STD MICRODIL/AGAR DIL: CPT

## 2018-10-11 PROCEDURE — 80061 LIPID PANEL: CPT

## 2018-10-11 PROCEDURE — 80053 COMPREHEN METABOLIC PANEL: CPT

## 2018-10-11 PROCEDURE — 87077 CULTURE AEROBIC IDENTIFY: CPT

## 2018-10-13 LAB
BACTERIA UR CULT: ABNORMAL
BACTERIA UR CULT: ABNORMAL

## 2018-10-16 ENCOUNTER — OFFICE VISIT (OUTPATIENT)
Dept: NEUROLOGY | Facility: CLINIC | Age: 78
End: 2018-10-16
Payer: MEDICARE

## 2018-10-16 ENCOUNTER — TELEPHONE (OUTPATIENT)
Dept: NEUROLOGY | Facility: CLINIC | Age: 78
End: 2018-10-16

## 2018-10-16 VITALS — DIASTOLIC BLOOD PRESSURE: 72 MMHG | RESPIRATION RATE: 15 BRPM | HEART RATE: 68 BPM | SYSTOLIC BLOOD PRESSURE: 124 MMHG

## 2018-10-16 DIAGNOSIS — M54.32 SCIATICA OF LEFT SIDE: Primary | ICD-10-CM

## 2018-10-16 PROCEDURE — 99214 OFFICE O/P EST MOD 30 MIN: CPT | Performed by: PSYCHIATRY & NEUROLOGY

## 2018-10-16 RX ORDER — TRAMADOL HYDROCHLORIDE 50 MG/1
TABLET ORAL
Qty: 45 TABLET | Refills: 2 | Status: SHIPPED | OUTPATIENT
Start: 2018-10-16 | End: 2020-06-17 | Stop reason: SINTOL

## 2018-10-16 NOTE — PROGRESS NOTES
Patient ID: Christy Butler is a 66 y o  female  Assessment/Plan:    Multiple sclerosis (HCC)  Pt here for neuro follow up  Pt last seen in June 2018  Overall doing well  Pt will be heading back to [facility] shortly  Pt will be there for about 6 months  Pt remains off imd meds  No change in exam today  Pt needs refill for tramadol which is very helpful for breakthru sciatic pain luis on the left  Pt remain on her coumadin , pt has pcp in [place]       Diagnoses and all orders for this visit:    Sciatica of left side  -     traMADol (ULTRAM) 50 mg tablet; 1 tab po q8 hours prn           Subjective:    HPI  Patient is a 66 year old female with PMH of MS diagnosed in 1982, DM, uterine CA and hyperlipidemia, 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,  who presents for MS follow up  Pt last seen in June 2018  Overall doing well  Pt will be heading back to [facility] shortly  Pt will be there for about 6 months  Pt remains off imd meds  No change in exam today  Pt needs refill for tramadol which is very helpful for breakthru sciatic pain luis on the left  Pt remains on her coumadin , pt has pcp in [facility]  Pt notes no other new sxs  No falls or trips  No change in bowel or bladder  No recent infections  No fever or chills  No loc or sz  No vertigo  No ha or n or v   Pt note no change in speech or swallowing  Pt notes no hospitalizations since last visit  Pt is excited about going back to [place] to see her children and grandchildren  Pt notes summer months bad with the heat and pt stays in air conditioning  Per my last note from 6/11/18 " No hx of optic neuritis  She has never been on IMD therapy  Patient reports at the age of 43 when she presented she had many tests done at [facility] and told she had probable MS  She had some difficulty with walking at that time  In 2000 while in the hospital for cellulitis from falls, she noted some additional overall worsening of her symptoms    Patient had been followed by the same neurologist for about 20 years  She was told that time of diagnosis that 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  Patient has had some tremor in the left arm, mainly with action and not at rest   Patient does have a strong family history of action tremor in her father and her paternal grandfather  It is likely that this is a benign essential tremor  Patient has not been interested in any updated imaging  "  Kept above paragraph due to long standing prior history  Pt remains not interested in updated studies  Total time spent today 25  minutes  Greater than 50% of total time was spent with the patient and / or family counseling and / or coordination of care      The following portions of the patient's history were reviewed and updated as appropriate: allergies, current medications, past family history, past medical history, past social history, past surgical history and problem list          Objective:    Blood pressure 124/72, pulse 68, resp  rate 15  Physical Exam   Constitutional: She appears well-developed and well-nourished  Eyes: Pupils are equal, round, and reactive to light  Lids are normal    Cardiovascular: Intact distal pulses  Neurological: She has normal reflexes  Psychiatric: Her speech is normal        Neurological Exam  Mental Status  Awake, alert and oriented to person, place and time  Recent and remote memory are intact  Speech is normal  Language is fluent with no aphasia  Attention and concentration are normal  Fund of knowledge is appropriate for level of education  Cranial Nerves  CN II: Visual acuity is normal  Visual fields full to confrontation  CN III, IV, VI: Extraocular movements intact bilaterally  Normal lids and orbits bilaterally  Pupils equal round and reactive to light bilaterally  CN V: Facial sensation is normal   CN VII: Full and symmetric facial movement  CN VIII: Hearing is normal   CN IX, X: Palate elevates symmetrically   Normal gag reflex  CN XI: Shoulder shrug strength is normal   CN XII: Tongue midline without atrophy or fasciculations  Motor  Normal muscle bulk throughout  Increased muscle tone  No abnormal involuntary movements  Right pronator drift  Right hemiparesis  Right upper and lower ext  4/5 prox right upper, distal 3-/5  Left upper ext 4+/5  Lower ext 1/5 evangelina  Sensory  Dec vib evangelina lowers  Reflexes  Deep tendon reflexes are 2+ and symmetric in all four extremities with downgoing toes bilaterally  Coordination  Right: Finger-to-nose abnormality:  Left: Finger-to-nose normal     Gait  Casual gait:  Scooter bound  Non ambulatory  ROS:    Review of Systems   Constitutional: Negative  Negative for appetite change and fever  HENT: Negative  Negative for hearing loss, tinnitus, trouble swallowing and voice change  Eyes: Negative  Negative for photophobia and pain  Respiratory: Negative  Negative for shortness of breath  Cardiovascular: Negative  Negative for palpitations  Gastrointestinal: Negative  Negative for nausea and vomiting  Endocrine: Negative  Negative for cold intolerance and heat intolerance  Genitourinary: Negative  Negative for dysuria, frequency and urgency  Musculoskeletal: Negative  Negative for myalgias and neck pain  Skin: Negative  Negative for rash  Neurological: Negative  Negative for dizziness, tremors, seizures, syncope, facial asymmetry, speech difficulty, weakness, light-headedness, numbness and headaches  Hematological: Negative  Does not bruise/bleed easily  Psychiatric/Behavioral: Negative  Negative for confusion, hallucinations and sleep disturbance

## 2018-10-16 NOTE — ASSESSMENT & PLAN NOTE
Pt here for neuro follow up  Pt last seen in June 2018  Overall doing well  Pt will be heading back to [place] shortly  Pt will be there for about 6 months  Pt remains off imd meds  No change in exam today  Pt needs refill for tramadol which is very helpful for breakthru sciatic pain luis on the left  Pt remain on her coumadin , pt has pcp in [place]

## 2018-10-16 NOTE — TELEPHONE ENCOUNTER
Please call pcp office dr Armida Diehl and notify staff that urine cx run by his office is positive for infection and pt is looking for further direction

## 2018-10-16 NOTE — PATIENT INSTRUCTIONS
Multiple sclerosis ([facility] )  Pt here for neuro follow up  Pt last seen in June 2018  Overall doing well  Pt will be heading back to [facility] shortly  Pt will be there for about 6 months  Pt remains off imd meds  No change in exam today  Pt needs refill for tramadol which is very helpful for breakthru sciatic pain luis on the left  Pt remain on her coumadin , pt has pcp in [facility]

## 2018-10-30 ENCOUNTER — DOCUMENTATION (OUTPATIENT)
Dept: NEUROLOGY | Facility: CLINIC | Age: 78
End: 2018-10-30

## 2018-10-30 NOTE — PROGRESS NOTES
Order for wrist splints was signed and faxed over to Khushi Martínez [phone], also scanned into the patient's chart

## 2018-12-27 ENCOUNTER — TELEPHONE (OUTPATIENT)
Dept: NEUROLOGY | Facility: CLINIC | Age: 78
End: 2018-12-27

## 2019-01-30 ENCOUNTER — TELEPHONE (OUTPATIENT)
Dept: NEUROLOGY | Facility: CLINIC | Age: 79
End: 2019-01-30

## 2019-01-30 NOTE — TELEPHONE ENCOUNTER
Caro with [facility] stated the elbow splint that Brian Dunbar ordered back in August was denied  She is requesting a letter of medical necessity "why patient needs it" so they can submit to insurance   She is requesting letter be faxed to: [phone]  (need this by Feb 10th)

## 2019-06-17 ENCOUNTER — OFFICE VISIT (OUTPATIENT)
Dept: NEUROLOGY | Facility: CLINIC | Age: 79
End: 2019-06-17
Payer: MEDICARE

## 2019-06-17 VITALS — SYSTOLIC BLOOD PRESSURE: 124 MMHG | DIASTOLIC BLOOD PRESSURE: 80 MMHG

## 2019-06-17 DIAGNOSIS — G35 MULTIPLE SCLEROSIS (HCC): Primary | ICD-10-CM

## 2019-06-17 PROCEDURE — 99214 OFFICE O/P EST MOD 30 MIN: CPT | Performed by: PSYCHIATRY & NEUROLOGY

## 2019-06-17 RX ORDER — LACTOBACILLUS ACIDOPHILUS 500MM CELL
CAPSULE ORAL
COMMUNITY
Start: 2007-10-11

## 2019-06-17 RX ORDER — TIZANIDINE 4 MG/1
4 TABLET ORAL 4 TIMES DAILY PRN
Qty: 360 TABLET | Refills: 3 | Status: SHIPPED | OUTPATIENT
Start: 2019-06-17 | End: 2020-10-05 | Stop reason: SDUPTHER

## 2019-06-17 RX ORDER — BACLOFEN 20 MG/1
20 TABLET ORAL 4 TIMES DAILY
Qty: 360 TABLET | Refills: 3 | Status: SHIPPED | OUTPATIENT
Start: 2019-06-17 | End: 2020-10-05 | Stop reason: SDUPTHER

## 2019-06-17 RX ORDER — GABAPENTIN 300 MG/1
CAPSULE ORAL
Qty: 450 CAPSULE | Refills: 3 | Status: SHIPPED | OUTPATIENT
Start: 2019-06-17 | End: 2020-05-04

## 2019-07-02 NOTE — PROGRESS NOTES
Occupational Therapy MS Evaluation:    Today's Date: 7/3/2019  Patient Name: Hansel Dunlap  : 1940  MRN: [id number]  Referring Provider: Ralph Sood MD  Dx: Multiple sclerosis [facility]) Jacob Johnston    Active Problem List:   Patient Active Problem List   Diagnosis    Benign essential tremor    Diabetes mellitus type 2, controlled, without complications ([facility] )    Hyperlipidemia    Hypertension, essential    Multiple sclerosis ([facility] )    Neurogenic bladder    Osteoporosis    Paresthesias    Spasticity     Past Medical Hx:   Past Medical History:   Diagnosis Date    Diabetes mellitus ([facility] )     Hyperlipidemia     Hypertension     [facility]     Paresthesias      Past Surgical Hx:   Past Surgical History:   Procedure Laterality Date    HYSTERECTOMY        Pain Levels:  Restin    With Activity:  0    Subjective/Patient Goal: "My hand and arm got tight again I'll be honest I didn't wear the braces because they weren't comfortable we went to [place] and couldn't figure out how to adjust it"    History of Present Illness:  Pt is a pleasant, active, retired 66 y o  female seen for OT eval s/p referred to [address] [place] [address] s/p follow up with neurology for long standing h/o MS, initially dx'd in , now c/o increased facial paresthesias, optic neuritis, difficulty walking, spasticity in RUE, difficulty managing electric w/c controls, also recommended for botox injections w/ PMR c/s, though pt hesitant, now interested in pursuing OT/PT first for more conservative means prior to pursuing PMR c/s, neuro cont'd w/ gabapentin, baclofen, and tizanidine scripts  Pt returned from trip to [place] and wants to continue services   dx'd w/ MS, spasticity, comorbidities as listed above      Lifestyle Performance Model:  Autonomy: Pt was I w/ UB dressing, max A for LB ADLs/self care, max A for bed bath sponge bathing, max A for catheter management bedpan, shower chair, BSC, grab bars, SPC, QC, RW, HHC, outpt PT, inpt rehab at [facility] in [facility], transfer board w/ gait belt for dependent lateral transfers, has not ambulated since 2000, primarily power scooter/electric w/c bound, tilt in space w/ gel Amanda Crate cushion  Reciprocal Relationships: Supportive  Janet Mckenzie  for 54 years Friday July 20th, 2 grown children 2 grandchildren  Service to Others: Pt is retired from [employer], worked in [employer]  Intrinsic Gratification: Enjoys doing sudoku, puzzles, reading, playing cards, crossword/jigaw puzzles  Home Setup: Pt [facility] [address] [facility] [place] w/  Denton Stoner [address] w/ 0 JEREL w/ first floor setup handicapped accessible    Objective  Impairments Section:   UE Strength:   MONY: RUE: 0/200 LUE: 30/200    Range of Motion:  AROM/PROM: RUE spasticity w/ AROM shoulder flexion limited to <25%, distal spastic flexor synergy nonfunctional w/ impaired FMC/FMS/GMC/GMS, impaired prehension patterns     R handed primarily, LUE intention tremors reports is unrelated to [address] reports "doctor said it's familial"  Sensation:  MYOFILAMENTS:              RUE: 3 61              LUE: 3 61    Assessment/Plan  Occupational Therapy Skilled Analysis Assessment and Plan of Care:  Pt requires overall max A for ADLs/self care and max A for sit pivot tfer w/ transfer board on/off all surfaces w/ electric w/c  Pt is currently demonstrating the following occupational deficits: limited 2* RUE spasticity w/ AROM shoulder flexion limited to <25%, distal spastic flexor synergy nonfunctional w/ impaired FMC/FMS/GMC/GMS, impaired prehension patterns, LUE intention tremors, decreased endurance/activity tolerance, generalized weakness, deconditioning, SOB, BLANCO, fatigue, fall risk, impaired balance, generalized spasticity, nonambulatory, kyphotic anterior forward flexed posture   Based on the aforementioned OT evaluation, functional performance deficits, and assessments, pt has been identified as a moderate complexity evaluation  Pt to continue to benefit from outpatient skilled OT services to address the following goals 2x/wk Short Term Goals for 4 weeks (8/3/19)(STG's=LTG's) and POC to  w/in 90 days (10/3/19) with special focus on self-care management, pt education, as well as coordination/UE motor training to improve above defiicits and enhance overall QOL/function  Goals:  Short Term Goals: (4 weeks)  Tone:  - Pt will demo good carryover of clinic and home tone reduction strategies to for improved AROM initiation with functional reach, dressing, hygiene  Modalities:  - Pt will tolerate BIONESS for improved motor and sensory performance for overall improved hand to target with 80% accuracy  - Pt will increase compliance with  [facility] for improved LPS with  improved fit/decreased discomfort with wear time (including modifications and adjustments including and not limited to the following: RHS, elbow immobilization splint, c-roll, sandwich splint PRN)  ROM/Function:  - Pt will demo with G carryover of Home Exercise Program for improved functional use of  RUE for stretching/home tone reduction    INTERVENTION COMMENTS:  Diagnosis: Multiple sclerosis ([facility] ) [G35]  Precautions: neurogenic bladder  FOTO: 24 with 76% limitation  Insurance: MEDICARE A AND B [[id number]]  3 of 10 visits, PN due 8/3/2019    Thank you for the consult!   Please call if you have any questions: [phone]  GERBER Martinez, LANI, OTR/L, C-GCM, CSRS, CBIS  Director of Outpatient Neuro Occupational Therapy

## 2019-07-03 ENCOUNTER — EVALUATION (OUTPATIENT)
Dept: OCCUPATIONAL THERAPY | Facility: CLINIC | Age: 79
End: 2019-07-03
Payer: COMMERCIAL

## 2019-07-03 DIAGNOSIS — G35 MULTIPLE SCLEROSIS (HCC): Primary | ICD-10-CM

## 2019-07-03 PROCEDURE — 97167 OT EVAL HIGH COMPLEX 60 MIN: CPT

## 2019-07-08 ENCOUNTER — OFFICE VISIT (OUTPATIENT)
Dept: OCCUPATIONAL THERAPY | Facility: CLINIC | Age: 79
End: 2019-07-08
Payer: COMMERCIAL

## 2019-07-08 DIAGNOSIS — G35 MULTIPLE SCLEROSIS (HCC): Primary | ICD-10-CM

## 2019-07-08 PROCEDURE — 97112 NEUROMUSCULAR REEDUCATION: CPT

## 2019-07-08 PROCEDURE — 97535 SELF CARE MNGMENT TRAINING: CPT

## 2019-07-08 NOTE — PROGRESS NOTES
Daily Note     Today's date: 2019  Patient name: Kat Ching  : 1940  MRN: [id number]  Referring provider: Mikaela Fenton MD  Dx:   Encounter Diagnosis   Name Primary?  Multiple sclerosis (HCC) Yes                  Subjective: "It's pinching me "      Objective: See treatment below  Provided brief intense TENS to bicep to fatigue out muscle tension and increase ROM-MH was also placed on bicep  Remainder of session was assessing, modifying, and fitting patient for elbow extension splint  Poor fit of splint with patient in extension  Patient and OTR to contact [place] for re-assessment  KRAFT to fabricate c-roll resting hand splint to prevent digit flexion contractures  Assessment: Tolerated treatment fair  Noted with poor fit of splint making wear schedule difficult due to possible skin breakdown  Patient hesitant to wear splint at home  Plan: Continued skilled OT per POC      INTERVENTION COMMENTS:  Diagnosis: Multiple sclerosis ([facility] ) [G35]  Precautions: neurogenic bladder  2 of 10 visits, PN due 8/3

## 2019-07-11 ENCOUNTER — OFFICE VISIT (OUTPATIENT)
Dept: OCCUPATIONAL THERAPY | Facility: CLINIC | Age: 79
End: 2019-07-11
Payer: COMMERCIAL

## 2019-07-11 DIAGNOSIS — G35 MULTIPLE SCLEROSIS (HCC): Primary | ICD-10-CM

## 2019-07-11 PROCEDURE — 97140 MANUAL THERAPY 1/> REGIONS: CPT

## 2019-07-11 PROCEDURE — 97112 NEUROMUSCULAR REEDUCATION: CPT

## 2019-07-11 NOTE — PROGRESS NOTES
Daily Note     Today's date: 2019  Patient name: Thomas Reynolds  : 1940  MRN: [id number]  Referring provider: Oswaldo Villanueva MD  Dx:   Encounter Diagnosis   Name Primary?  Multiple sclerosis (HCC) Yes                  Subjective: "I'm spasming "       Objective: See treatment below  MH to hand with TENS biphasic at 150 pulse width to bicep for reducing tone in distal bicep  Completed IASTM questionnaire and patient able to receive in HAND ONLY-provided treatment to all digits to increase extension  MFR to upper right trap and supraspinatus as patient presents with large muscular knot decreasing ROM in shoulder  PROM from proximal to distal to RUE  Assessment: Tolerated treatment well  Hypertonic distal bicep preventing patient from full elbow extension  Continue with PROM to increase tolerance to c-roll splint when fabricated  Plan: Continued skilled OT per POC      INTERVENTION COMMENTS:  Diagnosis: Multiple sclerosis ([facility] ) [G35]  Precautions: neurogenic bladder  3 of 10 visits, PN due 8/3

## 2019-07-15 ENCOUNTER — OFFICE VISIT (OUTPATIENT)
Dept: OCCUPATIONAL THERAPY | Facility: CLINIC | Age: 79
End: 2019-07-15
Payer: COMMERCIAL

## 2019-07-15 DIAGNOSIS — G35 MULTIPLE SCLEROSIS (HCC): Primary | ICD-10-CM

## 2019-07-15 PROCEDURE — 97110 THERAPEUTIC EXERCISES: CPT

## 2019-07-15 PROCEDURE — 97112 NEUROMUSCULAR REEDUCATION: CPT

## 2019-07-15 NOTE — PROGRESS NOTES
Occupational Therapy Daily Note:    Today's date: 7/15/2019  Patient name: Elva Batres  : 1940  MRN: [id number]  Referring provider: Hiram Hodges MD  Dx:   Encounter Diagnosis   Name Primary?  Multiple sclerosis ([facility] ) Yes     Subjective: "I'm really tight in my shoulder today"     Objective: Pt seen for OT treatment session  Pt engaged NMES to R wrist extensors for 10min for motor re-ed and tone reduction while tolerating proximal MH for tone reduction followed by vibration to R trap, biceps, supinators for tone reduction  Pt tolerated IASTM to traps, biceps, supinators/pronators and all digits for further tone reduction to increase extension and increase ROM in shoulder  PROM and AAROM to RUE in all planes I e  Shoulder flexion/extension, horizontal ab/adduction, elbow flexion/extension, wrist flexion/extension, supinatoin/pronation, and MCP/DIP/PIP extension       Assessment: Tolerated treatment well  Patient would benefit from continued skilled OT  Plan: Continued skilled OT per POC  INTERVENTION COMMENTS:  Diagnosis: Multiple sclerosis ([facility] ) [G35]  Precautions: neurogenic bladder  FOTO: 24 with 76% limitation  Insurance: MEDICARE A AND B [[id number]]  9 of 10 visits, PN due 8/3/2019     Thank you for the consult! Please call if you have any questions: [phone]  Halie Grams, OTD, LANI, OTR/L, C-GCM, CSRS, CBIS  Director of Outpatient Neuro Occupational Therapy  Thank you for the consult!   Please call if you have any questions: [phone]  Halie Grams, OTD, LANI, OTR/L, C-GCM, CSRS, CBIS  Director of Outpatient Neuro Occupational Therapy

## 2019-07-17 ENCOUNTER — TELEPHONE (OUTPATIENT)
Dept: NEUROLOGY | Facility: CLINIC | Age: 79
End: 2019-07-17

## 2019-07-17 NOTE — TELEPHONE ENCOUNTER
Do you mind calling them? Often times they need an office note faxed to them  If it is something they verbally need to review with clinical team, the call could then be transferred over to the clinical team  Thank you!

## 2019-07-17 NOTE — TELEPHONE ENCOUNTER
Alvah Blizzard from [facility] left voicemail on main line regarding patient's prescription for an elbow splint  They need more clinical info  [phone] is their phone number  Fax number is [phone]

## 2019-07-18 ENCOUNTER — APPOINTMENT (OUTPATIENT)
Dept: OCCUPATIONAL THERAPY | Facility: CLINIC | Age: 79
End: 2019-07-18
Payer: COMMERCIAL

## 2019-07-22 ENCOUNTER — APPOINTMENT (OUTPATIENT)
Dept: OCCUPATIONAL THERAPY | Facility: CLINIC | Age: 79
End: 2019-07-22
Payer: COMMERCIAL

## 2019-07-25 ENCOUNTER — OFFICE VISIT (OUTPATIENT)
Dept: OCCUPATIONAL THERAPY | Facility: CLINIC | Age: 79
End: 2019-07-25
Payer: COMMERCIAL

## 2019-07-25 DIAGNOSIS — G35 MULTIPLE SCLEROSIS (HCC): Primary | ICD-10-CM

## 2019-07-25 PROCEDURE — 97150 GROUP THERAPEUTIC PROCEDURES: CPT

## 2019-07-25 PROCEDURE — 97112 NEUROMUSCULAR REEDUCATION: CPT

## 2019-07-25 PROCEDURE — 97140 MANUAL THERAPY 1/> REGIONS: CPT

## 2019-07-25 NOTE — PROGRESS NOTES
Daily Note     Today's date: 2019  Patient name: Zoë Carranza  : 1940  MRN: [id number]  Referring provider: Ashleigh Carbajal MD  Dx:   Encounter Diagnosis   Name Primary?  Multiple sclerosis (HCC) Yes                  Subjective: "It just doesn't feel right "      Objective: See treatment below  Provided patient with TENS motor to biceps for tone reduction with MH and vibration  Manual techinues in upper trap followed by  IASTM to forearm and digits for tone reduction  Assessed resting hand splint for fit and observed proper fit with adjusted made by [facility] last week  PROM in all planes from proximal to distal in RUE  Re-assessed dynamic elbow extension splint for fit  Patient noted some discomfort on lateral side of elbow  Slight pink-ness noted on skin after wearing for 10 minutes, but patient report discomfort decreased after wear time increased  Educated patient to wear splint for 1 hour only each day and complete skin assessment to check for redness  Assessment: Tolerated treatment fair  Plan: Continued skilled OT per POC      INTERVENTION COMMENTS:  Diagnosis: Multiple sclerosis ([address] ) [G35]  Precautions: neurogenic bladder  5 of 10 visits, PN due 8/3

## 2019-07-26 ENCOUNTER — TELEPHONE (OUTPATIENT)
Dept: NEUROLOGY | Facility: CLINIC | Age: 79
End: 2019-07-26

## 2019-07-26 NOTE — TELEPHONE ENCOUNTER
Karen Dunn from [facility] called to request office notes from patients visit in 2018  Note printed and faxed to Karen Dunn at [phone]

## 2019-07-29 ENCOUNTER — OFFICE VISIT (OUTPATIENT)
Dept: OCCUPATIONAL THERAPY | Facility: CLINIC | Age: 79
End: 2019-07-29
Payer: COMMERCIAL

## 2019-07-29 DIAGNOSIS — G35 MULTIPLE SCLEROSIS (HCC): Primary | ICD-10-CM

## 2019-07-29 PROCEDURE — 97140 MANUAL THERAPY 1/> REGIONS: CPT

## 2019-07-29 PROCEDURE — 97112 NEUROMUSCULAR REEDUCATION: CPT

## 2019-07-29 NOTE — PROGRESS NOTES
Daily Note     Today's date: 2019  Patient name: Nicky Velasco  : 1940  MRN: [id number]  Referring provider: Bishop Aki MD  Dx:   Encounter Diagnosis   Name Primary?  Multiple sclerosis (HCC) Yes                  Subjective: "We got the splint on this morning, but then had trouble strapping it on  We're getting there though "      Objective: See treatment below  TENS biphasic on 200 for muscular fatigue with MH and manual MFR to RUE  PROM in all planes  Provided long passive stretch for 5 minutes with use of air cast to sustain elbow extension for splint placement  Assessment: Tolerated treatment well  Able to complete 3/4 range of elbow extension  Tone in bicep still present which is inhibiting full extension  Plan: Continued skilled OT per POC      INTERVENTION COMMENTS:  Diagnosis: Multiple sclerosis ([facility] ) [G35]  Precautions: neurogenic bladder  6 of 10 visits, PN due

## 2019-08-01 ENCOUNTER — EVALUATION (OUTPATIENT)
Dept: OCCUPATIONAL THERAPY | Facility: CLINIC | Age: 79
End: 2019-08-01
Payer: COMMERCIAL

## 2019-08-01 DIAGNOSIS — G35 MULTIPLE SCLEROSIS (HCC): Primary | ICD-10-CM

## 2019-08-01 PROCEDURE — 97112 NEUROMUSCULAR REEDUCATION: CPT

## 2019-08-01 PROCEDURE — 97140 MANUAL THERAPY 1/> REGIONS: CPT

## 2019-08-01 NOTE — PROGRESS NOTES
Occupational Therapy Neuro Progress Note/Status Update: Today's Date: 2019  Patient Name: Renetta Skaggs  : 1940  MRN: [id number]  Referring Provider: Harsh Cortes MD  Dx: Multiple sclerosis [facility]) Pina Montano    Active Problem List:   Patient Active Problem List   Diagnosis    Benign essential tremor    Diabetes mellitus type 2, controlled, without complications [facility] )    Hyperlipidemia    Hypertension, essential    Multiple sclerosis ([facility] )    Neurogenic bladder    Osteoporosis    Paresthesias    Spasticity     Past Medical Hx:   Past Medical History:   Diagnosis Date    [facility] mellitus ([facility] )     Hyperlipidemia     Hypertension     Multiple sclerosis ([facility] )     Paresthesias      Past Surgical Hx:   Past Surgical History:   Procedure Laterality Date    HYSTERECTOMY          Pain Levels:   Restin    With Activity:  0    Subjective/Patient Goal: "I feel like it's loosening up so I'd like to continue"    History of Present Illness:  Pt is a pleasant, active, retired 78 y  o  female seen for OT eval s/p referred to [address] [place] [address] s/p follow up with neurology for long standing h/o MS, initially dx'd in , now c/o increased facial paresthesias, optic neuritis, difficulty walking, spasticity in RUE, difficulty managing electric w/c controls, also recommended for botox injections w/ PMR c/s, though pt hesitant, now interested in pursuing OT/PT first for more conservative means prior to pursuing PMR c/s, neuro cont'd w/ gabapentin, baclofen, and tizanidine scripts  Pt returned from trip to [place] and wants to continue services   dx'd w/ MS, spasticity, comorbidities as listed above      Lifestyle Performance Model:  Autonomy: Pt was I w/ UB dressing, max A for LB ADLs/self care, max A for bed bath sponge bathing, max A for catheter management bedpan, shower chair, BSC, grab bars, SPC, QC, RW, HHC, outpt PT, inpt rehab at [facility] in [facility], transfer board w/ gait belt for dependent lateral transfers, has not ambulated since 2000, primarily power scooter/electric w/c bound, tilt in space w/ gel Londell Rude cushion  Reciprocal Relationships: Supportive  David  for 54 years Friday July 20th, 2 grown children 2 grandchildren  Service to Others: Pt is retired from [employer], worked in [employer]  Intrinsic Gratification: Enjoys doing sudoku, puzzles, reading, playing cards, crossword/jigaw puzzles  Home Setup: Pt lives off [address] [place] [address] in [place] w/  Esther Villalobos [address] [facility] w/ 0 [address]EREL w/ first floor setup handicapped accessible  Objective  Impairments Section:  UE Strength:              MONY: RUE: 0/200 LUE: 30/200     Range of Motion:  AROM/PROM: RUE spasticity w/ AROM shoulder flexion limited to <25%, distal spastic flexor synergy nonfunctional w/ impaired FMC/FMS/GMC/GMS, impaired prehension patterns     R handed primarily, LUE intention tremors reports is unrelated to MS reports "doctor said it's familial"  Sensation:  MYOFILAMENTS:              RUE: 3 61              LUE: 3 61    Assessment/Plan  Occupational Therapy Skilled Analysis Assessment and Plan of Care:  Pt requires overall max A for ADLs/self care and max A for sit pivot tfer w/ transfer board on/off all surfaces w/ electric w/c  Pt is currently demonstrating the following occupational deficits: limited 2* RUE spasticity w/ AROM shoulder flexion limited to <25%, distal spastic flexor synergy nonfunctional w/ impaired FMC/FMS/GMC/GMS, impaired prehension patterns, LUE intention tremors, decreased endurance/activity tolerance, generalized weakness, deconditioning, SOB, BLANCO, fatigue, fall risk, impaired balance, generalized spasticity, nonambulatory, kyphotic anterior forward flexed posture  Based on the aforementioned OT evaluation, functional performance deficits, and assessments, pt has been identified as a moderate complexity evaluation   Pt to continue to benefit from outpatient skilled OT services to address the following goals 2x/wk Short Term Goals for 4 weeks (8/3/19)(STG's=LTG's) and POC to  w/in 90 days (10/3/19) with special focus on self-care management, pt education, as well as coordination/UE motor training to improve above defiicits and enhance overall QOL/function  Pt seen for OT re-eval/progress note/status update  Pt continues to demonstrate RUE spasticity w/ flexor synergistic pattern though improved distal tone reduction  Pt to continue to benefit from OT for 2-4 more weeks 30 minute sessions only to address continued stretching, review of HEP, tone reduction, modalities, and functional preventative means, specifically focus on R shoulder and elbow tone reduction moving forward  Encouraged pt to call [place] to educate pt and  on donning/doffing elbow immobilization splint as pt expresses continued difficulty w/ same  Defer to [place]      Goals:  Short Term Goals: (4 weeks)  Tone:  · Pt will demo good carryover of clinic and home tone reduction strategies to for improved AROM initiation with functional reach, dressing, hygiene-MET  Modalities:  · Pt will tolerate BIONESS for improved motor and sensory performance for overall improved hand to target with 80% accuracy-MET  · Pt will increase compliance with  [facility] for improved LPS with  improved fit/decreased discomfort with wear time (including modifications and adjustments including and not limited to the following: RHS, elbow immobilization splint, c-roll, sandwich splint PRN)- MET  ROM/Function:  · Pt will demo with G carryover of Home Exercise Program for improved functional use of  RUE for stretching/home tone reduction-MET     INTERVENTION COMMENTS:  Diagnosis: Multiple sclerosis ([facility] ) [G35]  Precautions: neurogenic bladder  FOTO: 10% with 90% limitation  Insurance: MEDICARE A AND B [[id number]]  2 of 10 visits, PN N/A 2* D/C from OT Caseload     Thank you for the consult!   Please call if you have any questions: [phone]  GERBER Olson, LANI, OTR/L, C-GCM, CSRS, CBIS  Director of Outpatient Neuro Occupational Therapy

## 2019-08-01 NOTE — PROGRESS NOTES
Daily Note     Today's date: 2019  Patient name: Hansel Dunlap  : 1940  MRN: [id number]  Referring provider: Ralph Sood MD  Dx:   Encounter Diagnosis   Name Primary?  Multiple sclerosis (HCC) Yes                  Subjective: "We got the splint on once for about 20 min "      Objective: See treatment below  MH with TENS biphasic for 15 minutes followed with IASTM to palm and digits  PROM and MFR to bicep for increasing elbow extension  5 minutes in LPS with use of air cast  Re-eval completed by OTR  Assessment: Tolerated treatment well  See OTR re-eval for details  Plan: Continued skilled OT per POC      INTERVENTION COMMENTS:  Diagnosis: Multiple sclerosis ([facility] ) [G35]  Precautions: neurogenic bladder  7 of 10 visits, PN due 8/15

## 2019-08-05 ENCOUNTER — OFFICE VISIT (OUTPATIENT)
Dept: OCCUPATIONAL THERAPY | Facility: CLINIC | Age: 79
End: 2019-08-05
Payer: COMMERCIAL

## 2019-08-05 DIAGNOSIS — G35 MULTIPLE SCLEROSIS (HCC): Primary | ICD-10-CM

## 2019-08-05 PROCEDURE — 97140 MANUAL THERAPY 1/> REGIONS: CPT

## 2019-08-05 NOTE — PROGRESS NOTES
Daily Note     Today's date: 2019  Patient name: Win Dozier  : 1940  MRN: [id number]  Referring provider: Ana Cristina Armas MD  Dx:   Encounter Diagnosis   Name Primary?  Multiple sclerosis ([place] [address] ) Yes                  Subjective: "My shoulder feels really tight today "      Objective: See treatment below  MH to R shoulder and biceps for 10min for tone reduction, followed by PROM and passive stretch to shoulder, elbow, wrist, and digits all planes, with MFR to biceps for reduced tone and improved elbow ext  Assessment: Tolerated treatment well  Pt continues to complete 75% elbow ext with PROM and stretching  Pt continues to present with high tone especially in biceps, min improvement after manual techniques  Plan: Continue skilled OT per POC        INTERVENTION COMMENTS:  Diagnosis: Multiple sclerosis ([place] [address] ) [G35]  Precautions: neurogenic bladder  1 of 10 visits, PN due

## 2019-08-08 ENCOUNTER — OFFICE VISIT (OUTPATIENT)
Dept: OCCUPATIONAL THERAPY | Facility: CLINIC | Age: 79
End: 2019-08-08
Payer: COMMERCIAL

## 2019-08-08 DIAGNOSIS — G35 MULTIPLE SCLEROSIS (HCC): Primary | ICD-10-CM

## 2019-08-08 PROCEDURE — 97140 MANUAL THERAPY 1/> REGIONS: CPT

## 2019-08-08 PROCEDURE — 97150 GROUP THERAPEUTIC PROCEDURES: CPT

## 2019-08-08 NOTE — PROGRESS NOTES
Daily Note     Today's date: 2019  Patient name: Candy Topete  : 1940  MRN: [id number]  Referring provider: Luke Morocho MD  Dx:   Encounter Diagnosis   Name Primary?  Multiple sclerosis (HCC) Yes                  Subjective: "I keep forgetting to call about my splint, he needs to adjust it  I think to call at 800 at night"  Objective: See treatment diary below  MH to shoulder w/massager applied focusing on tone reduction  MFR to traps and bicep followed by PROM to same Shoulder PROM depression/elevation and protraction/retraction  Assessment: Tolerated treatment well  Patient would benefit from continued OT  Elbow extension near full range, FF near 1/2 range  Pt stated feeling stretch and improvement in tone       Plan: Continued skilled OT per POC     INTERVENTION COMMENTS:  Diagnosis: Multiple sclerosis ([facility] ) Willaim Rad  Precautions: neurogenic bladder  2 of 10 visits, PN due :  [id number]   [id number]

## 2019-08-12 ENCOUNTER — OFFICE VISIT (OUTPATIENT)
Dept: OCCUPATIONAL THERAPY | Facility: CLINIC | Age: 79
End: 2019-08-12
Payer: COMMERCIAL

## 2019-08-12 DIAGNOSIS — G35 MULTIPLE SCLEROSIS (HCC): Primary | ICD-10-CM

## 2019-08-12 PROCEDURE — 97140 MANUAL THERAPY 1/> REGIONS: CPT

## 2019-08-12 NOTE — PROGRESS NOTES
Daily Note     Today's date: 2019  Patient name: Candy Topete  : 1940  MRN: [id number]  Referring provider: Luke Morocho MD  Dx:   Encounter Diagnosis   Name Primary?  Multiple sclerosis ([facility] ) Yes                  Subjective: "My arm's really tight today "      Objective: See treatment below  Vibration through [address] to R shoulder and biceps for 10min for tone reduction  Manual STM and MFR to R upper trap and biceps for tone reduction  Seated PROM and passive stretch to shoulder, elbow, wrist, and digits all planes, with greatest focus on elbow ext  Assessment: Tolerated treatment well  Min improvement in elbow ext after manual techniques  Pt educated on using [address] and stretching RUE in home environment for improved ROM/ tone reduction  Plan: Continue skilled OT per POC        INTERVENTION COMMENTS:  Diagnosis: [facility] ([facility] [G35]  Precautions: neurogenic bladder  3 of 10 visits, PN due

## 2019-08-15 ENCOUNTER — OFFICE VISIT (OUTPATIENT)
Dept: OCCUPATIONAL THERAPY | Facility: CLINIC | Age: 79
End: 2019-08-15
Payer: COMMERCIAL

## 2019-08-15 DIAGNOSIS — G35 MULTIPLE SCLEROSIS (HCC): Primary | ICD-10-CM

## 2019-08-15 PROCEDURE — 97112 NEUROMUSCULAR REEDUCATION: CPT

## 2019-08-15 PROCEDURE — 97140 MANUAL THERAPY 1/> REGIONS: CPT

## 2019-08-15 NOTE — PROGRESS NOTES
Daily Note     Today's date: 8/15/2019  Patient name: Sharan Feng  : 1940  MRN: [id number]  Referring provider: León Sagastume MD  Dx:   Encounter Diagnosis   Name Primary?  Multiple sclerosis (HCC) Yes                  Subjective: "I wish I had more movement in my thumb "      Objective: See treatment below  Patient declined MH this day due to being humid outside  NMES to tricep to increase elbow extension while providing IASTM to palm and digits for tone reduction  PROM to RUE in all planes with MFR to upper trap  Trialed oval 8 to thumb for increasing extension and would benefit from a size 10  Size 10 unavailable in clinic, but noted size 9 was too small and size 12 was too large  Assessment: Tolerated treatment well  Noted with good passive extension of all digits  Still noted with difficulty donning elbow extension splint and has not called [facility] yet for follow up  Plan: Continued skilled OT per POC      INTERVENTION COMMENTS:  Diagnosis: Multiple sclerosis ([facility] ) [G35]  Precautions: neurogenic bladder  4 of 10 visits, PN due

## 2019-08-19 ENCOUNTER — EVALUATION (OUTPATIENT)
Dept: OCCUPATIONAL THERAPY | Facility: CLINIC | Age: 79
End: 2019-08-19
Payer: COMMERCIAL

## 2019-08-19 DIAGNOSIS — G35 MULTIPLE SCLEROSIS (HCC): Primary | ICD-10-CM

## 2019-08-19 PROCEDURE — 97140 MANUAL THERAPY 1/> REGIONS: CPT

## 2019-08-19 PROCEDURE — 97112 NEUROMUSCULAR REEDUCATION: CPT

## 2019-08-19 NOTE — PROGRESS NOTES
Occupational Therapy Neuro Progress Note/Status Update: Today's Date: 2019  Patient Name: Khari Cardona  : 1940  MRN: [id number]  Referring Provider: Erin Chandra MD  Dx: No primary diagnosis found  Active Problem List:   Patient Active Problem List   Diagnosis    Benign essential tremor    Diabetes mellitus type 2, controlled, without complications ([facility] )    Hyperlipidemia    Hypertension, essential    [facility] ([facility] )    Neurogenic bladder    Osteoporosis    Paresthesias    Spasticity     Past Medical Hx:   Past Medical History:   Diagnosis Date    Diabetes mellitus ([facility] )     Hyperlipidemia     Hypertension     Multiple sclerosis ([facility] )     Paresthesias      Past Surgical Hx:   Past Surgical History:   Procedure Laterality Date    HYSTERECTOMY        Pain Levels:   Restin    With Activity:  0    Subjective/Patient Goal: "I guess I'm just hoping that once the weather changes my arm will change"    History of Present Illness:  Pt is a pleasant, active, retired 78 y  o  female seen for OT eval s/p referred to [address] [place] [address] s/p follow up with neurology for long standing h/o MS, initially dx'd in , now c/o increased facial paresthesias, optic neuritis, difficulty walking, spasticity in RUE, difficulty managing electric w/c controls, also recommended for botox injections w/ PMR c/s, though pt hesitant, now interested in pursuing OT/PT first for more conservative means prior to pursuing PMR c/s, neuro cont'd w/ gabapentin, baclofen, and tizanidine scripts  Pt returned from trip to [place] and wants to continue services   dx'd w/ MS, spasticity, comorbidities as listed above      Lifestyle Performance Model:  Autonomy: Pt was I w/ UB dressing, max A for LB ADLs/self care, max A for bed bath sponge bathing, max A for catheter management bedpan, shower chair, BSC, grab bars, SPC, QC, RW, HHC, outpt PT, inpt rehab at [facility], transfer board w/ gait belt for dependent lateral transfers, has not ambulated since 2000, primarily power scooter/electric w/c bound, tilt in space w/ gel [place] Luis M cushion  Reciprocal Relationships: Supportive  David  for 54 years Friday July 20th, 2 grown children 2 grandchildren  Service to Others: Pt is retired from [employer], worked in [employer]  Intrinsic Gratification: Enjoys doing sudoku, puzzles, reading, playing cards, crossword/jigaw puzzles  Home Setup: Pt lives off [address] in [place] w/  Guymon Larson [address] w/ [address] w/ first floor setup handicapped accessible    Objective  Impairments Section:  UE Strength:              MONY: RUE: 0/200 LUE: 30/200     Range of Motion:  AROM/PROM: RUE spasticity w/ AROM shoulder flexion limited to <25%, distal spastic flexor synergy nonfunctional w/ impaired FMC/FMS/GMC/GMS, impaired prehension patterns     R handed primarily, LUE intention tremors reports is unrelated to [place] reports "doctor said it's familial"  Sensation:  MYOFILAMENTS:              RUE: 3 61              LUE: 3 61    Assessment/Plan  Occupational Therapy Skilled Analysis Assessment and Plan of Care:  Pt requires overall max A for ADLs/self care and max A for sit pivot tfer w/ transfer board on/off all surfaces w/ electric w/c  Pt is currently demonstrating the following occupational deficits: limited 2* RUE spasticity w/ AROM shoulder flexion limited to <25%, distal spastic flexor synergy nonfunctional w/ impaired FMC/FMS/GMC/GMS, impaired prehension patterns, LUE intention tremors, decreased endurance/activity tolerance, generalized weakness, deconditioning, SOB, BLANCO, fatigue, fall risk, impaired balance, generalized spasticity, nonambulatory, kyphotic anterior forward flexed posture  Based on the aforementioned OT evaluation, functional performance deficits, and assessments, pt has been identified as a moderate complexity evaluation   Pt to continue to benefit from outpatient skilled OT services to address the following goals 2x/wk Short Term Goals for 4 weeks (8/3/19)(STG's=LTG's) and POC to  w/in 90 days (10/3/19) with special focus on self-care management, pt education, as well as coordination/UE motor training to improve above defiicits and enhance overall QOL/function  Pt seen for OT re-eval/progress note/status update  Pt continues to demonstrate RUE hypertonicity w/ proximal spasticity and end range contracture in elbow flexion, shoulder flexion, spasticity w/ supination/pronation, wrist flexion/extension, and MCP/DIP/PIP tolerance for PROM able to achieve end range distally though only 50% AAROM proximally  At this point, pt has achieved max level of functional capacity, goals listed below have been achieved, pt and  aware of need to address further exploration, management, training, and education on orthotics for spasticity prevention and tone reduction PRN w/ [facility] orthotics, educated on HEP multiple visits  Pt and  agreeable, future appts removed, D/C from OT caseload  Recommend continued use of orthotics and HEP for ongoing longitudinal tone reduction   D/C OT      Goals:  Short Term Goals: (4 weeks)  Tone:  · Pt will demo good carryover of clinic and home tone reduction strategies to for improved AROM initiation with functional reach, dressing, hygiene-MET  Modalities:  · Pt will tolerate BIONESS for improved motor and sensory performance for overall improved hand to target with 80% accuracy-MET  · Pt will increase compliance with  [facility] for improved LPS with  improved fit/decreased discomfort with wear time (including modifications and adjustments including and not limited to the following: RHS, elbow immobilization splint, c-roll, sandwich splint PRN)-MET ([facility])  ROM/Function:  · Pt will demo with G carryover of Home Exercise Program for improved functional use of  RUE for stretching/home tone reduction-MET     INTERVENTION COMMENTS:  Diagnosis: Multiple sclerosis ([facility] ) [G35]  Precautions: neurogenic bladder  FOTO: 15 with 85% limitation  Insurance: MEDICARE A AND B [[id number]]  2 of 10 visits, PN N/A 2* D/C from OT Caseload     Thank you for the consult!   Please call if you have any questions: [phone]  GERBER Aguero, LANI, OTR/L, C-GCM, CSRS, CBIS  Director of Outpatient Neuro Occupational Therapy

## 2019-08-19 NOTE — PROGRESS NOTES
Daily Note     Today's date: 2019  Patient name: Andrea Kevin  : 1940  MRN: [id number]  Referring provider: Irma Astorga MD  Dx:   Encounter Diagnosis   Name Primary?  Multiple sclerosis ([place] [address] ) Yes                  Subjective: "I want a sliding board and gait belt "      Objective: See treatment below  Re-eval/DC completed by OTR  See assessment below for details  MH for 5 minutes to Right shoulder with NMES placed on tricep for elbow extension  Provided PROM to shoulder and elbow for tone reduction  Patient requested a sliding board and gait belt; recommended patient speak with MD for script to order  Assessment: Tolerated treatment well  No pain during session  Plan: D/C      INTERVENTION COMMENTS:  Diagnosis: Multiple sclerosis ([place] [address] ) [G35]  Precautions: neurogenic bladder  5 of 10 visits

## 2019-08-22 ENCOUNTER — APPOINTMENT (OUTPATIENT)
Dept: OCCUPATIONAL THERAPY | Facility: CLINIC | Age: 79
End: 2019-08-22
Payer: COMMERCIAL

## 2019-08-26 ENCOUNTER — APPOINTMENT (OUTPATIENT)
Dept: OCCUPATIONAL THERAPY | Facility: CLINIC | Age: 79
End: 2019-08-26
Payer: COMMERCIAL

## 2019-08-29 ENCOUNTER — APPOINTMENT (OUTPATIENT)
Dept: OCCUPATIONAL THERAPY | Facility: CLINIC | Age: 79
End: 2019-08-29
Payer: COMMERCIAL

## 2019-10-15 ENCOUNTER — OFFICE VISIT (OUTPATIENT)
Dept: NEUROLOGY | Facility: CLINIC | Age: 79
End: 2019-10-15
Payer: MEDICARE

## 2019-10-15 VITALS — HEART RATE: 78 BPM | DIASTOLIC BLOOD PRESSURE: 60 MMHG | SYSTOLIC BLOOD PRESSURE: 108 MMHG

## 2019-10-15 DIAGNOSIS — G35 MULTIPLE SCLEROSIS (HCC): Primary | ICD-10-CM

## 2019-10-15 DIAGNOSIS — G25.0 BENIGN ESSENTIAL TREMOR: ICD-10-CM

## 2019-10-15 PROCEDURE — 99214 OFFICE O/P EST MOD 30 MIN: CPT | Performed by: PHYSICIAN ASSISTANT

## 2019-10-15 NOTE — PROGRESS NOTES
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     She remains on gabapentin, baclofen and tizanidine for pain and spasticity  This has been stable  No new symptoms to report  She remains uninterested in updating imaging  She is getting ready to leave for [place] for the winter  Exam is stable today  She will follow up in 8 months when she returns to [place]  She was advised to call for any new/worsening symptoms    Benign essential tremor  Stable, no change  Diagnoses and all orders for this visit:    Multiple sclerosis ([facility] )    Benign essential tremor           Subjective:    HPI    Patient is a 78year old female with PMH of MS diagnosed in 1982, DM, uterine CA and hyperlipidemia, 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, who presents for MS follow up  Patient was last seen in June 2019  She has never been on IMD therapy for her MS  Patient reports at the age of 43 when she first presented she had many tests done at [facility] and told she had probable MS  She had some difficulty with walking at that time  In 2000 while in the hospital for cellulitis from falls, she noted some additional overall worsening of her symptoms  Patient had been followed by the same neurologist for about 20 years  She was told that time of diagnosis that 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  Patient has had some tremor in the left arm, mainly with action and not at rest   Patient does have a strong family history of action tremor in her father and her paternal grandfather  It is likely that this is a benign essential tremor  Patient has not been interested in any updated imaging  Today, patient reports she is doing well  No new symptoms  No recent illness, infection, hospitalizations    She did some OT over the summer, but no real changes in the [place]       The following portions of the patient's history were reviewed and updated as appropriate: current medications, past family history, past medical history, past social history, past surgical history and problem list          Objective:    Blood pressure 108/60, pulse 78  Physical Exam   Constitutional: She appears well-developed and well-nourished  HENT:   Head: Normocephalic and atraumatic  Eyes: Pupils are equal, round, and reactive to light  EOM are normal    Cardiovascular: Intact distal pulses  Neurological:   Reflex Scores:       Bicep reflexes are 1+ on the right side and 1+ on the left side  Brachioradialis reflexes are 1+ on the right side and 1+ on the left side  Patellar reflexes are 1+ on the right side and 1+ on the left side  Skin: Skin is warm and dry  Psychiatric: She has a normal mood and affect  Her speech is normal        Neurological Exam  Mental Status   Oriented to person, place, time and situation  Recent and remote memory are intact  Speech is normal  Language is fluent with no aphasia  Attention and concentration are normal     Cranial Nerves  CN II: Visual fields full to confrontation  CN III, IV, VI: Extraocular movements intact bilaterally  Pupils equal round and reactive to light bilaterally  CN V: Facial sensation is normal   CN VII: Full and symmetric facial movement  CN VIII: Hearing is normal   CN IX, X: Palate elevates symmetrically  Normal gag reflex  CN XI: Shoulder shrug strength is normal   CN XII: Tongue midline without atrophy or fasciculations  Motor   Normal muscle tone  The following abnormal movements were seen: Mild markel tremor on the left                                                Right                     Left   Shoulder abduction               4                          4+  Elbow flexion                         4                          4+  Elbow extension                    4                          4+  Hip flexion 0                          1  Dorsiflexion                            0                          1  Dystonia right hand  Bilateral MAFO braces   Sensory  Absent vibratory sensation evangelina LEs, decreased vib sensation RUE vs LUE  Reflexes                                           Right                      Left  Brachioradialis                    1+                         1+  Biceps                                 1+                         1+  Patellar                                1+                         1+    Coordination  Trouble with RENE in right UE due to spasticity  Gait  Non-ambulatory, in power wheelchair   ROS:    Review of Systems   Constitutional: Negative  Negative for appetite change and fever  HENT: Negative  Negative for hearing loss, tinnitus, trouble swallowing and voice change  Eyes: Negative  Negative for photophobia and pain  Respiratory: Negative  Negative for shortness of breath  Cardiovascular: Negative  Negative for palpitations  Gastrointestinal: Negative  Negative for nausea and vomiting  Endocrine: Negative  Negative for cold intolerance and heat intolerance  Genitourinary: Negative  Negative for dysuria, frequency and urgency  Musculoskeletal: Negative  Negative for myalgias and neck pain  Skin: Negative  Negative for rash  Neurological: Negative  Negative for dizziness, tremors, seizures, syncope, facial asymmetry, speech difficulty, weakness, light-headedness, numbness and headaches  Hematological: Negative  Does not bruise/bleed easily  Psychiatric/Behavioral: Negative  Negative for confusion, hallucinations and sleep disturbance       I personally reviewed and updated the ROS as appropriate

## 2019-10-15 NOTE — ASSESSMENT & PLAN NOTE
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

## 2019-10-15 NOTE — PATIENT INSTRUCTIONS
Continue all medications the same (gabapentin, tizanidine and baclofen)  Have a great time in [place]  See you in [place]

## 2020-02-12 ENCOUNTER — TELEPHONE (OUTPATIENT)
Dept: NEUROLOGY | Facility: CLINIC | Age: 80
End: 2020-02-12

## 2020-02-12 NOTE — TELEPHONE ENCOUNTER
I would recommend er evaluation due to the weakness as this is a change from her baseline  Question any infectious or new structural issue    Needs more expedited eval

## 2020-02-12 NOTE — TELEPHONE ENCOUNTER
Tony Chavez and relayed the information  After speaking with nurse prior, he did take patient's temperature and states that her temperature was 99 2 which is elevated for her  Advised to take patient to ED  Raphael Pierre advised he will keep an eye on temperature and make decision from there

## 2020-02-12 NOTE — TELEPHONE ENCOUNTER
Patient's  calling regarding patient from [place]  He states that patient had an outpatient cystoscopy on Monday 2/10/20  He states that she is still very lethargic after having this as well as is having continued general weakness  Patient also having a hard time holding glass of water per the  in her good hand  Advised to call the urologist, as they are the provider that is responsible for patient post-procedure  He states that he will do so, but wants Dr Sarah Powers opinion  I again advised that patient is post-procedure, so it is important that urology is aware that patient is having symptoms at this time and is not baseline  # [phone] okay to leave detailed message

## 2020-05-04 DIAGNOSIS — G35 MULTIPLE SCLEROSIS (HCC): ICD-10-CM

## 2020-05-04 RX ORDER — GABAPENTIN 300 MG/1
CAPSULE ORAL
Qty: 450 CAPSULE | Refills: 3 | Status: SHIPPED | OUTPATIENT
Start: 2020-05-04 | End: 2020-06-17 | Stop reason: SDUPTHER

## 2020-06-16 ENCOUNTER — TELEPHONE (OUTPATIENT)
Dept: NEUROLOGY | Facility: CLINIC | Age: 80
End: 2020-06-16

## 2020-06-17 ENCOUNTER — OFFICE VISIT (OUTPATIENT)
Dept: NEUROLOGY | Facility: CLINIC | Age: 80
End: 2020-06-17
Payer: MEDICARE

## 2020-06-17 VITALS — TEMPERATURE: 97.7 F | SYSTOLIC BLOOD PRESSURE: 132 MMHG | DIASTOLIC BLOOD PRESSURE: 78 MMHG

## 2020-06-17 DIAGNOSIS — M54.42 CHRONIC BILATERAL LOW BACK PAIN WITH BILATERAL SCIATICA: ICD-10-CM

## 2020-06-17 DIAGNOSIS — G25.0 BENIGN ESSENTIAL TREMOR: ICD-10-CM

## 2020-06-17 DIAGNOSIS — G35 MULTIPLE SCLEROSIS (HCC): Primary | ICD-10-CM

## 2020-06-17 DIAGNOSIS — M54.41 CHRONIC BILATERAL LOW BACK PAIN WITH BILATERAL SCIATICA: ICD-10-CM

## 2020-06-17 DIAGNOSIS — G89.29 CHRONIC BILATERAL LOW BACK PAIN WITH BILATERAL SCIATICA: ICD-10-CM

## 2020-06-17 PROCEDURE — 99214 OFFICE O/P EST MOD 30 MIN: CPT | Performed by: PHYSICIAN ASSISTANT

## 2020-06-17 RX ORDER — GABAPENTIN 300 MG/1
CAPSULE ORAL
Qty: 270 CAPSULE | Refills: 3
Start: 2020-06-17 | End: 2020-10-05 | Stop reason: SDUPTHER

## 2020-10-05 ENCOUNTER — OFFICE VISIT (OUTPATIENT)
Dept: NEUROLOGY | Facility: CLINIC | Age: 80
End: 2020-10-05
Payer: MEDICARE

## 2020-10-05 ENCOUNTER — TELEPHONE (OUTPATIENT)
Dept: NEUROLOGY | Facility: CLINIC | Age: 80
End: 2020-10-05

## 2020-10-05 VITALS — TEMPERATURE: 96.7 F | SYSTOLIC BLOOD PRESSURE: 136 MMHG | HEART RATE: 75 BPM | DIASTOLIC BLOOD PRESSURE: 78 MMHG

## 2020-10-05 DIAGNOSIS — N31.9 NEUROGENIC BLADDER: ICD-10-CM

## 2020-10-05 DIAGNOSIS — G35 MULTIPLE SCLEROSIS (HCC): ICD-10-CM

## 2020-10-05 DIAGNOSIS — G25.0 BENIGN ESSENTIAL TREMOR: ICD-10-CM

## 2020-10-05 DIAGNOSIS — G35 MULTIPLE SCLEROSIS (HCC): Primary | ICD-10-CM

## 2020-10-05 DIAGNOSIS — R25.2 SPASTICITY: ICD-10-CM

## 2020-10-05 PROCEDURE — 99214 OFFICE O/P EST MOD 30 MIN: CPT | Performed by: PSYCHIATRY & NEUROLOGY

## 2020-10-05 RX ORDER — TIZANIDINE 4 MG/1
4 TABLET ORAL 4 TIMES DAILY PRN
Qty: 360 TABLET | Refills: 0 | Status: SHIPPED | OUTPATIENT
Start: 2020-10-05 | End: 2021-06-15 | Stop reason: DRUGHIGH

## 2020-10-05 RX ORDER — BACLOFEN 20 MG/1
20 TABLET ORAL 4 TIMES DAILY
Qty: 360 TABLET | Refills: 3 | Status: SHIPPED | OUTPATIENT
Start: 2020-10-05 | End: 2021-06-15 | Stop reason: SDUPTHER

## 2020-10-05 RX ORDER — GABAPENTIN 300 MG/1
CAPSULE ORAL
Qty: 270 CAPSULE | Refills: 3
Start: 2020-10-05 | End: 2021-01-05 | Stop reason: SDUPTHER

## 2021-01-05 DIAGNOSIS — G35 MULTIPLE SCLEROSIS (HCC): ICD-10-CM

## 2021-01-05 RX ORDER — GABAPENTIN 300 MG/1
CAPSULE ORAL
Qty: 270 CAPSULE | Refills: 3
Start: 2021-01-05 | End: 2021-01-13 | Stop reason: SDUPTHER

## 2021-01-05 NOTE — TELEPHONE ENCOUNTER
Patient's  Pattie Adair left voicemail asking for refill of Gabapentin 300 mg tabs for 90 day supply with 3 refills  I have updated the script to reflect this  Please sign off if agreeable

## 2021-01-12 ENCOUNTER — PATIENT MESSAGE (OUTPATIENT)
Dept: NEUROLOGY | Facility: CLINIC | Age: 81
End: 2021-01-12

## 2021-01-12 DIAGNOSIS — G35 MULTIPLE SCLEROSIS (HCC): ICD-10-CM

## 2021-01-13 DIAGNOSIS — G35 MULTIPLE SCLEROSIS (HCC): ICD-10-CM

## 2021-01-13 RX ORDER — GABAPENTIN 300 MG/1
CAPSULE ORAL
Qty: 270 CAPSULE | Refills: 3
Start: 2021-01-13 | End: 2021-01-13 | Stop reason: SDUPTHER

## 2021-01-13 RX ORDER — GABAPENTIN 300 MG/1
CAPSULE ORAL
Qty: 270 CAPSULE | Refills: 3 | Status: SHIPPED | OUTPATIENT
Start: 2021-01-13 | End: 2021-06-15

## 2021-01-13 NOTE — TELEPHONE ENCOUNTER
Patient's  calling in to make sure refill of gabapentin goes to [facility]  I pended below to go electronically    I do see 1/5 encounter and [employer] encounter 1/12  Please review and sign off, thanks!

## 2021-02-12 DIAGNOSIS — Z23 ENCOUNTER FOR IMMUNIZATION: ICD-10-CM

## 2021-06-15 ENCOUNTER — OFFICE VISIT (OUTPATIENT)
Dept: NEUROLOGY | Facility: CLINIC | Age: 81
End: 2021-06-15
Payer: MEDICARE

## 2021-06-15 VITALS — HEART RATE: 76 BPM | SYSTOLIC BLOOD PRESSURE: 124 MMHG | DIASTOLIC BLOOD PRESSURE: 58 MMHG

## 2021-06-15 DIAGNOSIS — G35 MULTIPLE SCLEROSIS (HCC): ICD-10-CM

## 2021-06-15 PROCEDURE — 99214 OFFICE O/P EST MOD 30 MIN: CPT | Performed by: PSYCHIATRY & NEUROLOGY

## 2021-06-15 RX ORDER — BACLOFEN 20 MG/1
20 TABLET ORAL 4 TIMES DAILY
Qty: 360 TABLET | Refills: 3 | Status: SHIPPED | OUTPATIENT
Start: 2021-06-15

## 2021-06-15 RX ORDER — GABAPENTIN 300 MG/1
300 CAPSULE ORAL 4 TIMES DAILY
Qty: 270 CAPSULE | Refills: 3 | Status: SHIPPED | OUTPATIENT
Start: 2021-06-15

## 2021-06-15 RX ORDER — AMLODIPINE BESYLATE 5 MG/1
TABLET ORAL
COMMUNITY
Start: 2021-06-09

## 2021-06-15 RX ORDER — TIZANIDINE 2 MG/1
2 TABLET ORAL 3 TIMES DAILY
Qty: 270 TABLET | Refills: 0 | Status: SHIPPED | OUTPATIENT
Start: 2021-06-15 | End: 2021-09-13

## 2021-06-15 NOTE — PROGRESS NOTES
Patient ID: Talha Ramirez is a [de-identified] y o  female  Assessment/Plan:    Multiple sclerosis ([facility] )  Patient presents in follow up for MS  Overall doing well, no new or worsening symptoms  MS has been stable  Not on DMT at this time  No recent illness, injuries, or hospitalizations  She has received both of the Moderna vaccines for COVID-19 and tolerated them well without any significant side effects or pseudo-exacerbation of MS symptoms  Sciatic pain is worsening since decreasing Gabapentin from 5 tablets daily to 3 tablets daily due to daytime sleepiness  Having more difficulty with wheelchair controls due to her right hand spasticity  Plan:  · Refill Baclofen  · Refill Tizanidine at 2 mg instead of 4 mg TID  · Increase Gabapentin to 300 mg QID  · Referral to wheelchair clinic  · Follow up in clinic in 6 months       Diagnoses and all orders for this visit:    [facility] ([facility] )  -     tiZANidine (ZANAFLEX) 2 mg tablet; Take 1 tablet (2 mg total) by mouth 3 (three) times a day  -     baclofen 20 mg tablet; Take 1 tablet (20 mg total) by mouth 4 (four) times a day  -     gabapentin (NEURONTIN) 300 mg capsule; Take 1 capsule (300 mg total) by mouth 4 (four) times a day TAKE 1 CAPSULE BY MOUTH THREE TIMES A DAY    Other orders  -     amLODIPine (NORVASC) 5 mg tablet           Subjective:    HPI     I had the pleasure of seeing your patient, Talha Ramirez, today in the Neurology clinic in follow up for MS  She was last seen on 10/5/2020 by Dr Agatha Jean Baptiste  The following was copied from prior chart due to complexity:  "Patient has PMH of MS diagnosed in 1982, DM, uterine CA and hyperlipidemia, 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  She has never been on IMD therapy for her MS  Patient reports at the age of 43 when she first presented she had many tests done at [facility] and told she had probable MS  She had some difficulty with walking at that time    In 2000 while in the hospital for cellulitis from falls, she noted some additional overall worsening of her symptoms  Patient had been followed by the same neurologist for about 20 years  She was told that time of diagnosis that 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  Patient has had some tremor in the left arm, mainly with action and not at rest   Patient does have a strong family history of action tremor in her father and her paternal grandfather  It is likely that this is a benign essential tremor  Patient has not been interested in any updated imaging "    Today, patient reports that she has been having more sciatic pain in her left leg since her last visit  She previously was on gabapentin 300 mg five times per day  This was decreased to three times daily due to increased somnolence  She is interested in possibly increasing it back to four tablets daily  Otherwise, she has been feeling well  No recent illnesses, injuries, or hospitalizations  She did receive both of the COVID-19 Moderna vaccines and had no side effects  Also no pseudo-exacerbation of MS symptoms  Having more difficulty with the controls on her wheelchair  Her right hand is having more spasms/clenched fist and it is difficult to control the joystick  At last visit they were considering moving it to the left side  Patient now thinks that she is not coordinated enough on her left to control the wheelchair without crashing into things  We discussed this further today and they would be interested in seeing if there is a different control mechanism that she could still use with her right hand  Ezekiel Dillon has been in contact with the family and will help set up wheelchair evaluation  The following portions of the patient's history were reviewed and updated as appropriate: allergies, current medications, past family history, past medical history, past social history, past surgical history and problem list          Objective:    Blood pressure 124/58, pulse 76      Physical Exam  Vitals and nursing note reviewed  Constitutional:       General: She is not in acute distress  Appearance: Normal appearance  She is not ill-appearing  Comments: Patient is sitting in power wheelchair   HENT:      Head: Normocephalic and atraumatic  Nose: Nose normal       Mouth/Throat:      Mouth: Mucous membranes are moist       Pharynx: Oropharynx is clear  Eyes:      General: Lids are normal       Extraocular Movements: Extraocular movements intact  Conjunctiva/sclera: Conjunctivae normal    Cardiovascular:      Rate and Rhythm: Normal rate  Pulses: Normal pulses  Pulmonary:      Effort: Pulmonary effort is normal  No respiratory distress  Musculoskeletal:      Cervical back: Normal range of motion and neck supple  Skin:     General: Skin is warm and dry  Capillary Refill: Capillary refill takes less than 2 seconds  Neurological:      Deep Tendon Reflexes:      Reflex Scores:       Tricep reflexes are 2+ on the right side and 2+ on the left side  Bicep reflexes are 2+ on the right side and 2+ on the left side  Brachioradialis reflexes are 2+ on the right side and 2+ on the left side  Patellar reflexes are 1+ on the right side and 1+ on the left side  Achilles reflexes are 1+ on the right side and 1+ on the left side  Psychiatric:         Mood and Affect: Mood normal          Speech: Speech normal          Behavior: Behavior normal          Neurological Exam  Mental Status  Awake, alert and oriented to person, place and time  Speech is normal  Language is fluent with no aphasia  Cranial Nerves  CN II: Visual fields full to confrontation  CN III, IV, VI: Extraocular movements intact bilaterally  Normal lids and orbits bilaterally  CN V: Facial sensation is normal   CN VII: Full and symmetric facial movement  CN IX, X: Palate elevates symmetrically  CN XII: Tongue midline without atrophy or fasciculations  Motor   Increased muscle tone   Increased tone and spasticity in right upper extremity  Right                     Left   Shoulder abduction               4                          4+  Elbow flexion                         4                          4+  Elbow extension                    4                          4+  Finger extension                    2                          5-  Hip flexion                              0                          1  Knee flexion                           0                          1  Knee extension                      0                          1  Plantarflexion                         0                          1  Dorsiflexion                            0                          1  Toe extension                        0                          1    Sensory  Vibration abnormality: No vibratory sensation in lower extremities  Reflexes                                           Right                      Left  Brachioradialis                    2+                         2+  Biceps                                 2+                         2+  Triceps                                2+                         2+  Patellar                                1+                         1+  Achilles                                1+                         1+    Coordination  Coordination intact in left upper extremity  Difficult to complete with right upper extremity due to spasticity  Gait  Patient is in a power wheelchair and does not ambulate due to lower extremity paresis  ROS:    Review of Systems   Constitutional: Negative  Negative for appetite change and fever  HENT: Negative  Negative for hearing loss, tinnitus, trouble swallowing and voice change  Eyes: Negative  Negative for photophobia and pain  Respiratory: Negative  Negative for shortness of breath  Cardiovascular: Negative  Negative for palpitations  Gastrointestinal: Negative    Negative for nausea and vomiting  Endocrine: Negative  Negative for cold intolerance  Genitourinary: Negative  Negative for dysuria, frequency and urgency  Musculoskeletal: Negative for myalgias and neck pain  + sciatic pain in leg   Skin: Negative  Negative for rash  Neurological: Negative  Negative for dizziness, tremors, seizures, syncope, facial asymmetry, speech difficulty, weakness, light-headedness, numbness and headaches  Hematological: Negative  Does not bruise/bleed easily  Psychiatric/Behavioral: Negative  Negative for confusion, hallucinations and sleep disturbance      ======    Thank you for allowing me to participate in the care of your patient, DO Damion Chavez [facility] Neurology Residency, PGY-1

## 2021-06-15 NOTE — ASSESSMENT & PLAN NOTE
Patient presents in follow up for MS  Overall doing well, no new or worsening symptoms  MS has been stable  Not on DMT at this time  No recent illness, injuries, or hospitalizations  She has received both of the Moderna vaccines for COVID-19 and tolerated them well without any significant side effects or pseudo-exacerbation of MS symptoms  Sciatic pain is worsening since decreasing Gabapentin from 5 tablets daily to 3 tablets daily due to daytime sleepiness  Having more difficulty with wheelchair controls due to her right hand spasticity      Plan:  · Refill Baclofen  · Refill Tizanidine at 2 mg instead of 4 mg TID  · Increase Gabapentin to 300 mg QID  · Referral to wheelchair clinic  · Follow up in clinic in 6 months

## 2021-06-24 DIAGNOSIS — G35 MULTIPLE SCLEROSIS (HCC): Primary | ICD-10-CM

## 2021-07-29 ENCOUNTER — TELEPHONE (OUTPATIENT)
Dept: NEUROLOGY | Facility: CLINIC | Age: 81
End: 2021-07-29

## 2021-07-29 NOTE — TELEPHONE ENCOUNTER
Called [facility], spoke w/Sweta and advised of the below  Gabapentin 300 mg 1 cap qid  Quantity of 360  She verbalized understanding

## 2021-07-29 NOTE — TELEPHONE ENCOUNTER
[facility] clarify the directions for the prescription for neurontin 300 mg cap  Call back # [phone]    Chart reviwed: [facility]  It looks like there were 2 set of sig  Take 1 capsule (300 mg total) by mouth 4 (four) times a day TAKE 1 CAPSULE BY MOUTH THREE TIMES A DAY    Per office notes dated 6/15/21-Increase Gabapentin to 300 mg QID  If this is correct, quantity for 90 day should be 360 tabs  Are you agreeable?  If so, will call [facility]    thanks

## 2021-09-08 ENCOUNTER — TELEPHONE (OUTPATIENT)
Dept: NEUROLOGY | Facility: CLINIC | Age: 81
End: 2021-09-08